# Patient Record
Sex: FEMALE | Race: BLACK OR AFRICAN AMERICAN | Employment: UNEMPLOYED | URBAN - METROPOLITAN AREA
[De-identification: names, ages, dates, MRNs, and addresses within clinical notes are randomized per-mention and may not be internally consistent; named-entity substitution may affect disease eponyms.]

---

## 2019-01-29 ENCOUNTER — APPOINTMENT (OUTPATIENT)
Dept: ULTRASOUND IMAGING | Age: 34
End: 2019-01-29
Attending: EMERGENCY MEDICINE
Payer: MEDICAID

## 2019-01-29 ENCOUNTER — HOSPITAL ENCOUNTER (EMERGENCY)
Age: 34
Discharge: HOME | End: 2019-01-29
Attending: EMERGENCY MEDICINE
Payer: MEDICAID

## 2019-01-29 VITALS
OXYGEN SATURATION: 100 % | HEART RATE: 77 BPM | RESPIRATION RATE: 16 BRPM | SYSTOLIC BLOOD PRESSURE: 124 MMHG | TEMPERATURE: 97.9 F | DIASTOLIC BLOOD PRESSURE: 85 MMHG

## 2019-01-29 DIAGNOSIS — N93.9 VAGINAL BLEEDING: Primary | ICD-10-CM

## 2019-01-29 DIAGNOSIS — D21.9 FIBROID: ICD-10-CM

## 2019-01-29 LAB
BASOPHILS # BLD: 0 K/UL (ref 0–0.1)
BASOPHILS NFR BLD: 0 % (ref 0–1)
COMMENT, HOLDF: NORMAL
DIFFERENTIAL METHOD BLD: ABNORMAL
EOSINOPHIL # BLD: 0.1 K/UL (ref 0–0.4)
EOSINOPHIL NFR BLD: 1 % (ref 0–7)
ERYTHROCYTE [DISTWIDTH] IN BLOOD BY AUTOMATED COUNT: 13.5 % (ref 11.5–14.5)
HCG UR QL: NEGATIVE
HCT VFR BLD AUTO: 33.8 % (ref 35–47)
HGB BLD-MCNC: 11.1 G/DL (ref 11.5–16)
IMM GRANULOCYTES # BLD AUTO: 0 K/UL (ref 0–0.04)
IMM GRANULOCYTES NFR BLD AUTO: 0 % (ref 0–0.5)
LYMPHOCYTES # BLD: 2.8 K/UL (ref 0.8–3.5)
LYMPHOCYTES NFR BLD: 51 % (ref 12–49)
MCH RBC QN AUTO: 29.9 PG (ref 26–34)
MCHC RBC AUTO-ENTMCNC: 32.8 G/DL (ref 30–36.5)
MCV RBC AUTO: 91.1 FL (ref 80–99)
MONOCYTES # BLD: 0.4 K/UL (ref 0–1)
MONOCYTES NFR BLD: 8 % (ref 5–13)
NEUTS SEG # BLD: 2.2 K/UL (ref 1.8–8)
NEUTS SEG NFR BLD: 40 % (ref 32–75)
NRBC # BLD: 0 K/UL (ref 0–0.01)
NRBC BLD-RTO: 0 PER 100 WBC
PLATELET # BLD AUTO: 268 K/UL (ref 150–400)
PMV BLD AUTO: 9.2 FL (ref 8.9–12.9)
RBC # BLD AUTO: 3.71 M/UL (ref 3.8–5.2)
SAMPLES BEING HELD,HOLD: NORMAL
WBC # BLD AUTO: 5.5 K/UL (ref 3.6–11)

## 2019-01-29 PROCEDURE — 99283 EMERGENCY DEPT VISIT LOW MDM: CPT

## 2019-01-29 PROCEDURE — 81025 URINE PREGNANCY TEST: CPT

## 2019-01-29 PROCEDURE — 36415 COLL VENOUS BLD VENIPUNCTURE: CPT

## 2019-01-29 PROCEDURE — 85025 COMPLETE CBC W/AUTO DIFF WBC: CPT

## 2019-01-29 PROCEDURE — 76830 TRANSVAGINAL US NON-OB: CPT

## 2019-01-29 PROCEDURE — 76856 US EXAM PELVIC COMPLETE: CPT

## 2019-01-29 RX ORDER — MEDROXYPROGESTERONE ACETATE 10 MG/1
10 TABLET ORAL 2 TIMES DAILY
Qty: 10 TAB | Refills: 0 | Status: SHIPPED | OUTPATIENT
Start: 2019-01-29

## 2019-01-29 NOTE — DISCHARGE INSTRUCTIONS
Patient Education      Billy Falling up your new prescription  Abnormal Uterine Bleeding: Care Instructions  Your Care Instructions    Abnormal uterine bleeding (AUB) is irregular bleeding from the uterus that is longer or heavier than usual or does not occur at your regular time. Sometimes it is caused by changes in hormone levels. It can also be caused by growths in the uterus, such as fibroids or polyps. Sometimes a cause cannot be found. You may have heavy bleeding when you are not expecting your period. Your doctor may suggest a pregnancy test, if you think you are pregnant. Follow-up care is a key part of your treatment and safety. Be sure to make and go to all appointments, and call your doctor if you are having problems. It's also a good idea to know your test results and keep a list of the medicines you take. How can you care for yourself at home? · Be safe with medicines. Take pain medicines exactly as directed. ? If the doctor gave you a prescription medicine for pain, take it as prescribed. ? If you are not taking a prescription pain medicine, ask your doctor if you can take an over-the-counter medicine. · You may be low in iron because of blood loss. Eat a balanced diet that is high in iron and vitamin C. Foods rich in iron include red meat, shellfish, eggs, beans, and leafy green vegetables. Talk to your doctor about whether you need to take iron pills or a multivitamin. When should you call for help? Call 911 anytime you think you may need emergency care.  For example, call if:    · You passed out (lost consciousness).    Call your doctor now or seek immediate medical care if:    · You have new or worse belly or pelvic pain.     · You have severe vaginal bleeding.     · You feel dizzy or lightheaded, or you feel like you may faint.    Watch closely for changes in your health, and be sure to contact your doctor if:    · You think you may be pregnant.     · Your bleeding gets worse.     · You do not get better as expected. Where can you learn more? Go to http://isaías-keon.info/. Enter Z446 in the search box to learn more about \"Abnormal Uterine Bleeding: Care Instructions. \"  Current as of: May 14, 2018  Content Version: 11.9  © 3871-2046 [employer]. Care instructions adapted under license by [employer] (which disclaims liability or warranty for this information). If you have questions about a medical condition or this instruction, always ask your healthcare professional. [address] any warranty or liability for your use of this information.

## 2019-01-29 NOTE — ED TRIAGE NOTES
Pt reports onset of vaginal bleeding 2 weeks ago, onset a couple of days after finishing menstrual cycle. Pt reports being dx with pelvic inflammatory disease, taking abx, reports bleeding is not improved.

## 2019-01-29 NOTE — ED NOTES
Pt given discharge instructions, patient education, prescription, and follow up information by Dr. Becka Morfin. Pt states understanding. All questions answered. Pt discharged to home in private vehicle, ambulatory. Pt A/Ox4, RA, pain controlled.

## 2019-01-29 NOTE — ED PROVIDER NOTES
HPI History of present illness:  4 para 2 abortus 2; patient had a normal period that ended about 19 days ago. Bleeding started about 15 days ago. Bleeding is about as heavy as a normal menstrual period although the patient has passed several large clots which sometimes happens with her menses. Patient is not having a lot of pelvic pain. Patient was seen in another ER. A pelvic exam was done. Apparently no medications were prescribed. The patient was seen at her primary care office [address] [place] [address] on [employer]. They apparently told the patient that she had an enlarged uterus. They asked her if she had fibroids which she has no previous history of. This started the patient on antibiotics for possible PID although the patient says they were not certain that she really had it. Patient has had some pelvic pain which has decreased since antibiotics were started. The bleeding again has continued for 15 days and is about the same throughout. Patient does not feel weak or dizzy or like she might faint. Past Medical History:  
Diagnosis Date  Drug abuse ([facility]   
 heroin daily habit  Heroin abuse ([facility] History reviewed. No pertinent surgical history. Family History:  
Problem Relation Age of Onset  Other Father   
     hepatitis b from IVDA in Father Social History Socioeconomic History  Marital status: SINGLE Spouse name: Not on file  Number of children: Not on file  Years of education: Not on file  Highest education level: Not on file Social Needs  Financial resource strain: Not on file  Food insecurity - worry: Not on file  Food insecurity - inability: Not on file  Transportation needs - medical: Not on file  Transportation needs - non-medical: Not on file Occupational History  Not on file Tobacco Use  Smoking status: Current Every Day Smoker Packs/day: 0.50  Smokeless tobacco: Never Used  Tobacco comment: vapes Substance and Sexual Activity  Alcohol use: No  
 Drug use: Yes Types: Heroin Comment: heroin insufflation daily  Sexual activity: Yes Other Topics Concern  Not on file Social History Narrative  Not on file ALLERGIES: Phenergan [promethazine] Review of Systems Constitutional: Negative for activity change, appetite change, chills and fever. HENT: Negative for congestion. Eyes: Negative for pain. Respiratory: Negative for chest tightness, shortness of breath and wheezing. Cardiovascular: Negative for chest pain. Gastrointestinal: Negative for abdominal distention and vomiting. Genitourinary: Positive for vaginal bleeding. Negative for decreased urine volume. Neurological: Negative for syncope. Psychiatric/Behavioral: Negative for agitation, behavioral problems and confusion. Vitals:  
 01/29/19 1514 01/29/19 1611 01/29/19 1612 BP:   127/87 Pulse: 80  93 Resp:   16 Temp:   97.9 °F (36.6 °C) SpO2: 100% 98% 98% Physical Exam  
Constitutional: She appears well-developed and well-nourished. HENT:  
Head: Normocephalic and atraumatic. Mouth/Throat: Oropharynx is clear and moist.  
Eyes: EOM are normal. Pupils are equal, round, and reactive to light. Neck: Normal range of motion. Neck supple. Cardiovascular: Normal rate, regular rhythm, normal heart sounds and intact distal pulses. Exam reveals no gallop and no friction rub. No murmur heard. Pulmonary/Chest: Effort normal. No respiratory distress. She has no wheezes. She has no rales. Abdominal: Soft. There is no tenderness. There is no rebound. Musculoskeletal: Normal range of motion. She exhibits no tenderness. Neurological: She is alert. No cranial nerve deficit. Motor; symmetric Skin: No erythema. Psychiatric: She has a normal mood and affect. Her behavior is normal.  
Nursing note and vitals reviewed. MDM Procedures

## 2019-02-18 ENCOUNTER — OFFICE VISIT (OUTPATIENT)
Dept: OBGYN CLINIC | Age: 34
End: 2019-02-18

## 2019-02-18 VITALS
BODY MASS INDEX: 26.52 KG/M2 | SYSTOLIC BLOOD PRESSURE: 130 MMHG | DIASTOLIC BLOOD PRESSURE: 80 MMHG | HEIGHT: 66 IN | WEIGHT: 165 LBS

## 2019-02-18 DIAGNOSIS — N93.9 ABNORMAL UTERINE BLEEDING (AUB): Primary | ICD-10-CM

## 2019-02-18 DIAGNOSIS — Z30.432 ENCOUNTER FOR IUD REMOVAL: ICD-10-CM

## 2019-02-18 LAB
HCG URINE, QL. (POC): NEGATIVE
VALID INTERNAL CONTROL?: YES

## 2019-02-18 NOTE — PATIENT INSTRUCTIONS
Abnormal Uterine Bleeding: Care Instructions  Your Care Instructions    Abnormal uterine bleeding (AUB) is irregular bleeding from the uterus that is longer or heavier than usual or does not occur at your regular time. Sometimes it is caused by changes in hormone levels. It can also be caused by growths in the uterus, such as fibroids or polyps. Sometimes a cause cannot be found. You may have heavy bleeding when you are not expecting your period. Your doctor may suggest a pregnancy test, if you think you are pregnant. Follow-up care is a key part of your treatment and safety. Be sure to make and go to all appointments, and call your doctor if you are having problems. It's also a good idea to know your test results and keep a list of the medicines you take. How can you care for yourself at home? · Be safe with medicines. Take pain medicines exactly as directed. ? If the doctor gave you a prescription medicine for pain, take it as prescribed. ? If you are not taking a prescription pain medicine, ask your doctor if you can take an over-the-counter medicine. · You may be low in iron because of blood loss. Eat a balanced diet that is high in iron and vitamin C. Foods rich in iron include red meat, shellfish, eggs, beans, and leafy green vegetables. Talk to your doctor about whether you need to take iron pills or a multivitamin. When should you call for help? Call 911 anytime you think you may need emergency care. For example, call if:    · You passed out (lost consciousness).    Call your doctor now or seek immediate medical care if:    · You have new or worse belly or pelvic pain.     · You have severe vaginal bleeding.     · You feel dizzy or lightheaded, or you feel like you may faint.    Watch closely for changes in your health, and be sure to contact your doctor if:    · You think you may be pregnant.     · Your bleeding gets worse.     · You do not get better as expected.    Where can you learn more?  Go to http://isaías-keon.info/. Enter B654 in the search box to learn more about \"Abnormal Uterine Bleeding: Care Instructions. \"  Current as of: May 14, 2018  Content Version: 11.9  © 9140-0436 [employer]. Care instructions adapted under license by [employer] (which disclaims liability or warranty for this information). If you have questions about a medical condition or this instruction, always ask your healthcare professional. [place] [address] any warranty or liability for your use of this information.

## 2019-02-18 NOTE — PROGRESS NOTES
Abnormal Uterine Bleeding      Ms. Gloria Velarde is a 29 y.o.  female presenting for evaluation of abnormal uterine bleeding. She states she has had irregular bleeding for approximately 1 month. Has copper IUD x9yr, desires removal.  Records reviewed, small simple ov cyst and small fibroid on US. Her current episode of bleeding none for the past week    Previously regular monthly cycles? yes  Menses typically last 4 days. Intermenstrual spotting/bleeding? no  Pad or tampon count: changes every 2 hours. Passage of large clots? no  Flooding? no  Associated symptoms include pelvic pain  CP, SOB, dizziness, weakness or fatigue? no  Signs of excess androgens (ie-unwanted hair growth, acne, etc)? no    Alleviating factors: none  Aggravating factors: none      History of anemia? no  Prior transfusions? no    Ob/Gyn Hx:    Patient's last menstrual period was 2018. Contraception:  STI: Denies  SA- Yes. Health maintenance:  Pap- unknown  Gardasil- n/a  Mammo- not of age  Colonoscopy- not of age    Past Medical History:   Diagnosis Date    Drug abuse ([facility]     heroin daily habit    [facility]         History reviewed. No pertinent surgical history.     Family History   Problem Relation Age of Onset    Other Father         hepatitis b from IVDA in Father       Social History     Socioeconomic History    Marital status: SINGLE     Spouse name: Not on file    Number of children: Not on file    Years of education: Not on file    Highest education level: Not on file   Social Needs    Financial resource strain: Not on file    Food insecurity - worry: Not on file    Food insecurity - inability: Not on file   [employer] needs - medical: Not on file   [employer] needs - non-medical: Not on file   Occupational History    Not on file   Tobacco Use    Smoking status: Current Every Day Smoker     Packs/day: 0.50    Smokeless tobacco: Never Used    Tobacco comment: vapes Substance and Sexual Activity    Alcohol use: No    Drug use: Yes     Types: Heroin     Comment: heroin insufflation daily    Sexual activity: Yes     Birth control/protection: IUD   Other Topics Concern    Not on file   Social History Narrative    Not on file       Prior to Admission medications    Medication Sig Start Date End Date Taking? Authorizing Provider   medroxyPROGESTERone (PROVERA) 10 mg tablet Take 1 Tab by mouth two (2) times a day. 1/29/19   Duyen Aparicio MD   cloNIDine (CATAPRES) 0.1 mg tablet Take 1 Tab by mouth every eight (8) hours as needed (feeling shaky or agitate from heroin withdrawal) for 10 doses. 7/22/13   Lorenzo Almendarez MD   ondansetron (ZOFRAN ODT) 4 mg disintegrating tablet Take 1 Tab by mouth every eight (8) hours as needed for Nausea. 7/22/13   Lorenzo Almendarez MD   ALPRAZolam Jackalyn [place]) 0.25 mg tablet Take 0.25 mg by mouth two (2) times daily as needed. Indications: ANXIETY    Provider, Historical        Allergies   Allergen Reactions    Phenergan [Promethazine] Other (comments)     Dystonia       Review of Systems - History obtained from the patient  Constitutional: negative for weight loss, fever, night sweats  HEENT: negative for hearing loss, earache, congestion, snoring, sorethroat  CV: negative for chest pain, palpitations, edema  Resp: negative for cough, shortness of breath, wheezing  Breast: negative for breast lumps, nipple discharge, galactorrhea  GI: negative for change in bowel habits, abdominal pain, black or bloody stools.  Occ constipation  : negative for frequency, dysuria, hematuria, +vaginal bleeding, as per HPI  MSK: negative for back pain, joint pain, muscle pain  Skin: negative for itching, rash, hives  Neuro: negative for dizziness, headache, confusion, weakness  Psych: negative for anxiety, depression, change in mood  Heme/lymph: negative for bleeding, bruising, pallor      Objective:    Visit Vitals  /80 (BP 1 Location: Left arm, BP Patient Position: Sitting)   Ht 5' 6\" (1.676 m)   Wt 165 lb (74.8 kg)   LMP 01/05/2018   BMI 26.63 kg/m²       PHYSICAL EXAMINATION    Constitutional  · Appearance: well-nourished, well developed, alert, in no acute distress    HENT  · Head and Face: appears normal    Neck  · Inspection/Palpation: normal appearance, no masses or tenderness  · Lymph Nodes: no lymphadenopathy present  · Thyroid: gland size normal, nontender, no nodules or masses present on palpation    Chest  · Respiratory Effort: non-labored breathing  · Auscultation: CTAB, normal breath sounds    Cardiovascular  · Heart:  · Auscultation: regular rate and rhythm without murmur  · Extremities: no peripheral edema    Gastrointestinal  · Abdominal Examination: abdomen non-tender to palpation, normal bowel sounds, no masses present  · Liver and spleen: no hepatomegaly present, spleen not palpable  · Hernias: no hernias identified    Genitourinary  · External Genitalia: normal appearance for age, no discharge present, no tenderness present, no inflammatory lesions present, no masses present, no atrophy present  · Vagina: normal vaginal vault without central or paravaginal defects, no discharge present, no inflammatory lesions present, no masses present, 0 scopettes of blood in vault  · Bladder: non-tender to palpation  · Urethra: appears normal  · Cervix: normal   · Uterus: normal size, shape and consistency, mobile  · Adnexa: no adnexal tenderness present, no adnexal masses present  · Perineum: perineum within normal limits, no evidence of trauma, no rashes or skin lesions present    Skin  · General Inspection: no rash, no lesions identified    Neurologic/Psychiatric  · Mental Status:  · Orientation: grossly oriented to person, place and time  · Mood and Affect: mood normal, affect appropriate    No results found for this or any previous visit (from the past 24 hour(s)).     UPT - neg    Assessment/Plan:   Madison Augustine is a 29 y.o. female presenting for evaluation of AUB. Discussed possible etiologies of AUB including side effect of copper IUD. Discussed work-up of AUB including exam today and TSH. CBC done in ER. She desires IUD removal today with expectant mgmt. Declines progesterone options including IUD at this time. Plan to repeat US for cyst resolution in 6-8wks    Reviewed bleeding precautions and reasons to come into ER (bleeding >1-2 pads/tampons per hour, associated dizziness, SOB, CP, fever, etc). All questions answered. RTC: prn or sooner for any problems or concerns  Instructions and handouts given to patient    Eva Segura  2019  1:43 PM     Signed By: Snehal Greer MD     2019      IUD REMOVAL    Chart reviewed for the following:  I have reviewed the History & Physical and updated Rebecca EVER Patels allergies. TIME OUT performed immediately prior to start of procedure:  I performed the following reviews on Fiordaliza Freeman prior to the start of the procedure:  Patient was identified by name and date of birth   Agreement on procedure being performed was verified  Risks and Benefits explained to the patient  Consent was signed and verified  Time: 1400  Date of procedure: 2019  Procedure performed by:  Snehal Greer MD  How tolerated by patient: Well  Post Procedural Pain Scale: 2-Hurts Minimally  Comments: None      Indications for Removal:  Fiordaliza Freeman is a ,  29 y.o. female [address] whose Patient's last menstrual period was 2018.  2018. who presents today for IUD removal.  The IUD removal procedure was discussed with the patient and she had no further questions. Procedure: The patient was placed in a dorsal lithotomy position and appropriately draped. On bimanual exam the uterus was anterior and normal in size with no tenderness present. A speculum exam was performed and the cervix was visualized. The cervix was prepped with zephiran solution. The IUD string was visualized.  Using ring forceps , the string was grasped and the IUD removed intact. The IUD was shown to the patient.      Mitch Sheehan MD 2:11 PM

## 2019-03-01 ENCOUNTER — HOSPITAL ENCOUNTER (EMERGENCY)
Age: 34
Discharge: HOME | End: 2019-03-01
Attending: EMERGENCY MEDICINE
Payer: MEDICAID

## 2019-03-01 VITALS
TEMPERATURE: 98 F | RESPIRATION RATE: 16 BRPM | HEART RATE: 80 BPM | HEIGHT: 66 IN | WEIGHT: 165 LBS | OXYGEN SATURATION: 99 % | BODY MASS INDEX: 26.52 KG/M2 | DIASTOLIC BLOOD PRESSURE: 70 MMHG | SYSTOLIC BLOOD PRESSURE: 132 MMHG

## 2019-03-01 DIAGNOSIS — N93.9 ABNORMAL UTERINE BLEEDING: Primary | ICD-10-CM

## 2019-03-01 LAB
COMMENT, HOLDF: NORMAL
SAMPLES BEING HELD,HOLD: NORMAL
T4 FREE SERPL-MCNC: 1.1 NG/DL (ref 0.8–1.5)
TSH SERPL DL<=0.05 MIU/L-ACNC: 1.01 UIU/ML (ref 0.36–3.74)

## 2019-03-01 PROCEDURE — 84439 ASSAY OF FREE THYROXINE: CPT

## 2019-03-01 PROCEDURE — 36415 COLL VENOUS BLD VENIPUNCTURE: CPT

## 2019-03-01 PROCEDURE — 99282 EMERGENCY DEPT VISIT SF MDM: CPT

## 2019-03-01 PROCEDURE — 84443 ASSAY THYROID STIM HORMONE: CPT

## 2019-03-01 NOTE — ED TRIAGE NOTES
\"I've been told that I have thyroid problems. I just got medicaid last week and will try and make an appointment with someone to see me. My temperature fluctuates and I'm here to see if someone can do something about it\".

## 2019-03-01 NOTE — ED PROVIDER NOTES
29year old female hx heroin abuse (clean for 9 months) presenting possible thyroid problem. Pt notes that about 1.5-2 months ago went to [facility] ED for vaginal bleeding, \"I was given a bullcrap diagnosis\" and followed up with GYN, treated for PID, finished medication for that. Then came to the ED here, did US showing fibroid, referred to GYN who said that they ddi not think that the issue was the fibroid because it was small. GYN said that he thought it was a thyroid issue. Pt notes that she googled it and feels that she has these symptoms - \"my body can't control it's temperature. \"  Reports feeling cold and hot at times. Also reports \"rough patches\" to skin. PMHx: as above Social: remote hx heroin use Past Medical History:  
Diagnosis Date  Drug abuse ([facility]   
 heroin daily habit  Heroin abuse ([facility] History reviewed. No pertinent surgical history. Family History:  
Problem Relation Age of Onset  Other Father   
     hepatitis b from IVDA in Father Social History Socioeconomic History  Marital status: SINGLE Spouse name: Not on file  Number of children: Not on file  Years of education: Not on file  Highest education level: Not on file Social Needs  Financial resource strain: Not on file  Food insecurity - worry: Not on file  Food insecurity - inability: Not on file  Transportation needs - medical: Not on file  Transportation needs - non-medical: Not on file Occupational History  Not on file Tobacco Use  Smoking status: Current Every Day Smoker Packs/day: 0.50  Smokeless tobacco: Never Used  Tobacco comment: vapes Substance and Sexual Activity  Alcohol use: No  
 Drug use: Yes Types: Heroin Comment: heroin insufflation daily  Sexual activity: Yes Birth control/protection: IUD Other Topics Concern  Not on file Social History Narrative  Not on file ALLERGIES: Phenergan [promethazine] Review of Systems Constitutional: Positive for fever (subjective). Respiratory: Negative for shortness of breath. Cardiovascular: Negative for chest pain. Gastrointestinal: Negative for vomiting. Skin: Negative for wound. Neurological: Negative for syncope. All other systems reviewed and are negative. Vitals:  
 03/01/19 1714 03/01/19 1829 03/01/19 2002 BP:  129/76 132/70 Pulse: 92 85 80 Resp:  17 16 Temp:  98.1 °F (36.7 °C) 98 °F (36.7 °C) SpO2: 98% 98% 99% Weight:  74.8 kg (165 lb) Height:  5' 6\" (1.676 m) Physical Exam  
Constitutional: She is oriented to person, place, and time. She appears well-developed and well-nourished. No distress. Well-appearing AA female HENT:  
Head: Normocephalic and atraumatic. Right Ear: External ear normal.  
Left Ear: External ear normal.  
Eyes: Conjunctivae are normal. No scleral icterus. Neck: Neck supple. No tracheal deviation present. Cardiovascular: Normal rate, regular rhythm and normal heart sounds. Exam reveals no gallop and no friction rub. No murmur heard. Pulmonary/Chest: Effort normal and breath sounds normal. No stridor. No respiratory distress. She has no wheezes. Abdominal: Soft. She exhibits no distension. Musculoskeletal: Normal range of motion. Neurological: She is alert and oriented to person, place, and time. Skin: Skin is warm and dry. Psychiatric: She has a normal mood and affect. Her behavior is normal.  
Nursing note and vitals reviewed. MDM Number of Diagnoses or Management Options Abnormal uterine bleeding:  
Diagnosis management comments: 29year old female presenting for multiple complaints, concerned that she may be hyperthyroid. Has seen GYN for AUB, was told that old thyroid labs were abnormal.  Thyroid labs normal today.   Discussed the need for continued GYN follow up for bleeding (joe has since stopped) and also primary care follow up. Amount and/or Complexity of Data Reviewed Clinical lab tests: ordered and reviewed Discuss the patient with other providers: yes (Dr. Clau Doe ED attending) Procedures

## 2019-03-01 NOTE — ED NOTES
Patient reports abnormal vaginal bleeding, dry skin, brittle hair and fluctuations in body temperatures.

## 2019-03-02 NOTE — DISCHARGE INSTRUCTIONS
Patient Education        Abnormal Uterine Bleeding: Care Instructions  Your Care Instructions    Abnormal uterine bleeding (AUB) is irregular bleeding from the uterus that is longer or heavier than usual or does not occur at your regular time. Sometimes it is caused by changes in hormone levels. It can also be caused by growths in the uterus, such as fibroids or polyps. Sometimes a cause cannot be found. You may have heavy bleeding when you are not expecting your period. Your doctor may suggest a pregnancy test, if you think you are pregnant. Follow-up care is a key part of your treatment and safety. Be sure to make and go to all appointments, and call your doctor if you are having problems. It's also a good idea to know your test results and keep a list of the medicines you take. How can you care for yourself at home? · Be safe with medicines. Take pain medicines exactly as directed. ? If the doctor gave you a prescription medicine for pain, take it as prescribed. ? If you are not taking a prescription pain medicine, ask your doctor if you can take an over-the-counter medicine. · You may be low in iron because of blood loss. Eat a balanced diet that is high in iron and vitamin C. Foods rich in iron include red meat, shellfish, eggs, beans, and leafy green vegetables. Talk to your doctor about whether you need to take iron pills or a multivitamin. When should you call for help? Call 911 anytime you think you may need emergency care. For example, call if:    · You passed out (lost consciousness).    Call your doctor now or seek immediate medical care if:    · You have new or worse belly or pelvic pain.     · You have severe vaginal bleeding.     · You feel dizzy or lightheaded, or you feel like you may faint.    Watch closely for changes in your health, and be sure to contact your doctor if:    · You think you may be pregnant.     · Your bleeding gets worse.     · You do not get better as expected.    Where can you learn more? Go to http://isaías-keon.info/. Enter U097 in the search box to learn more about \"Abnormal Uterine Bleeding: Care Instructions. \"  Current as of: May 14, 2018  Content Version: 11.9  © 7293-1314 [employer]. Care instructions adapted under license by [employer] (which disclaims liability or warranty for this information). If you have questions about a medical condition or this instruction, always ask your healthcare professional. [place] [address] any warranty or liability for your use of this information.

## 2021-05-15 ENCOUNTER — APPOINTMENT (OUTPATIENT)
Dept: CT IMAGING | Age: 36
DRG: 603 | End: 2021-05-15
Attending: PHYSICIAN ASSISTANT

## 2021-05-15 ENCOUNTER — HOSPITAL ENCOUNTER (INPATIENT)
Age: 36
LOS: 2 days | Discharge: HOME | DRG: 603 | End: 2021-05-17
Attending: EMERGENCY MEDICINE | Admitting: STUDENT IN AN ORGANIZED HEALTH CARE EDUCATION/TRAINING PROGRAM

## 2021-05-15 DIAGNOSIS — L03.115 CELLULITIS OF RIGHT LEG: Primary | ICD-10-CM

## 2021-05-15 DIAGNOSIS — F19.10 INTRAVENOUS DRUG ABUSE (HCC): ICD-10-CM

## 2021-05-15 LAB
ALBUMIN SERPL-MCNC: 3.5 G/DL (ref 3.5–5)
ALBUMIN/GLOB SERPL: 0.7 {RATIO} (ref 1.1–2.2)
ALP SERPL-CCNC: 128 U/L (ref 45–117)
ALT SERPL-CCNC: 33 U/L (ref 12–78)
ANION GAP SERPL CALC-SCNC: 3 MMOL/L (ref 5–15)
AST SERPL-CCNC: 48 U/L (ref 15–37)
BASOPHILS # BLD: 0.1 K/UL (ref 0–0.1)
BASOPHILS NFR BLD: 1 % (ref 0–1)
BILIRUB SERPL-MCNC: 0.4 MG/DL (ref 0.2–1)
BUN SERPL-MCNC: 16 MG/DL (ref 6–20)
BUN/CREAT SERPL: 20 (ref 12–20)
CALCIUM SERPL-MCNC: 8.3 MG/DL (ref 8.5–10.1)
CHLORIDE SERPL-SCNC: 104 MMOL/L (ref 97–108)
CO2 SERPL-SCNC: 27 MMOL/L (ref 21–32)
COMMENT, HOLDF: NORMAL
CREAT SERPL-MCNC: 0.81 MG/DL (ref 0.55–1.02)
DIFFERENTIAL METHOD BLD: ABNORMAL
EOSINOPHIL # BLD: 0.1 K/UL (ref 0–0.4)
EOSINOPHIL NFR BLD: 1 % (ref 0–7)
ERYTHROCYTE [DISTWIDTH] IN BLOOD BY AUTOMATED COUNT: 15.4 % (ref 11.5–14.5)
GLOBULIN SER CALC-MCNC: 4.9 G/DL (ref 2–4)
GLUCOSE SERPL-MCNC: 97 MG/DL (ref 65–100)
HCT VFR BLD AUTO: 28.9 % (ref 35–47)
HGB BLD-MCNC: 9.2 G/DL (ref 11.5–16)
IMM GRANULOCYTES # BLD AUTO: 0 K/UL
IMM GRANULOCYTES NFR BLD AUTO: 0 %
LACTATE SERPL-SCNC: 1.1 MMOL/L (ref 0.4–2)
LYMPHOCYTES # BLD: 3 K/UL (ref 0.8–3.5)
LYMPHOCYTES NFR BLD: 46 % (ref 12–49)
MCH RBC QN AUTO: 25.3 PG (ref 26–34)
MCHC RBC AUTO-ENTMCNC: 31.8 G/DL (ref 30–36.5)
MCV RBC AUTO: 79.6 FL (ref 80–99)
MONOCYTES # BLD: 0.5 K/UL (ref 0–1)
MONOCYTES NFR BLD: 7 % (ref 5–13)
NEUTS SEG # BLD: 2.9 K/UL (ref 1.8–8)
NEUTS SEG NFR BLD: 45 % (ref 32–75)
NRBC # BLD: 0 K/UL (ref 0–0.01)
NRBC BLD-RTO: 0 PER 100 WBC
PLATELET # BLD AUTO: 235 K/UL (ref 150–400)
PMV BLD AUTO: 10.7 FL (ref 8.9–12.9)
POTASSIUM SERPL-SCNC: 4.4 MMOL/L (ref 3.5–5.1)
PROT SERPL-MCNC: 8.4 G/DL (ref 6.4–8.2)
RBC # BLD AUTO: 3.63 M/UL (ref 3.8–5.2)
RBC MORPH BLD: ABNORMAL
SAMPLES BEING HELD,HOLD: NORMAL
SODIUM SERPL-SCNC: 134 MMOL/L (ref 136–145)
WBC # BLD AUTO: 6.6 K/UL (ref 3.6–11)
WBC MORPH BLD: ABNORMAL

## 2021-05-15 PROCEDURE — 74011250637 HC RX REV CODE- 250/637: Performed by: PHYSICIAN ASSISTANT

## 2021-05-15 PROCEDURE — 74011000258 HC RX REV CODE- 258: Performed by: PHYSICIAN ASSISTANT

## 2021-05-15 PROCEDURE — 74011250636 HC RX REV CODE- 250/636: Performed by: PHYSICIAN ASSISTANT

## 2021-05-15 PROCEDURE — 99285 EMERGENCY DEPT VISIT HI MDM: CPT

## 2021-05-15 PROCEDURE — 87147 CULTURE TYPE IMMUNOLOGIC: CPT

## 2021-05-15 PROCEDURE — 73701 CT LOWER EXTREMITY W/DYE: CPT

## 2021-05-15 PROCEDURE — 96365 THER/PROPH/DIAG IV INF INIT: CPT

## 2021-05-15 PROCEDURE — 87040 BLOOD CULTURE FOR BACTERIA: CPT

## 2021-05-15 PROCEDURE — 74011000636 HC RX REV CODE- 636: Performed by: RADIOLOGY

## 2021-05-15 PROCEDURE — 87077 CULTURE AEROBIC IDENTIFY: CPT

## 2021-05-15 PROCEDURE — 96375 TX/PRO/DX INJ NEW DRUG ADDON: CPT

## 2021-05-15 PROCEDURE — 85025 COMPLETE CBC W/AUTO DIFF WBC: CPT

## 2021-05-15 PROCEDURE — 65270000029 HC RM PRIVATE

## 2021-05-15 PROCEDURE — 83605 ASSAY OF LACTIC ACID: CPT

## 2021-05-15 PROCEDURE — 36415 COLL VENOUS BLD VENIPUNCTURE: CPT

## 2021-05-15 PROCEDURE — 87205 SMEAR GRAM STAIN: CPT

## 2021-05-15 PROCEDURE — 80053 COMPREHEN METABOLIC PANEL: CPT

## 2021-05-15 RX ORDER — KETOROLAC TROMETHAMINE 30 MG/ML
15 INJECTION, SOLUTION INTRAMUSCULAR; INTRAVENOUS
Status: COMPLETED | OUTPATIENT
Start: 2021-05-15 | End: 2021-05-15

## 2021-05-15 RX ORDER — OXYCODONE HYDROCHLORIDE 5 MG/1
5 TABLET ORAL
Status: COMPLETED | OUTPATIENT
Start: 2021-05-15 | End: 2021-05-15

## 2021-05-15 RX ORDER — VANCOMYCIN/0.9 % SOD CHLORIDE 1.5G/250ML
1500 PLASTIC BAG, INJECTION (ML) INTRAVENOUS ONCE
Status: COMPLETED | OUTPATIENT
Start: 2021-05-15 | End: 2021-05-16

## 2021-05-15 RX ADMIN — IOPAMIDOL 100 ML: 612 INJECTION, SOLUTION INTRAVENOUS at 22:13

## 2021-05-15 RX ADMIN — OXYCODONE HYDROCHLORIDE 5 MG: 5 TABLET ORAL at 20:54

## 2021-05-15 RX ADMIN — SODIUM CHLORIDE 1000 ML: 9 INJECTION, SOLUTION INTRAVENOUS at 20:54

## 2021-05-15 RX ADMIN — KETOROLAC TROMETHAMINE 15 MG: 30 INJECTION, SOLUTION INTRAMUSCULAR; INTRAVENOUS at 20:54

## 2021-05-15 RX ADMIN — PIPERACILLIN AND TAZOBACTAM 3.38 G: 3; .375 INJECTION, POWDER, FOR SOLUTION INTRAVENOUS at 22:26

## 2021-05-15 RX ADMIN — VANCOMYCIN HYDROCHLORIDE 1500 MG: 10 INJECTION, POWDER, LYOPHILIZED, FOR SOLUTION INTRAVENOUS at 23:12

## 2021-05-15 NOTE — ED TRIAGE NOTES
Pt reports abscess to R calf area x1 day. Pt reports it worsened yesterday and she attempted to salazar it and now there is redness surrounding it.

## 2021-05-15 NOTE — ED PROVIDER NOTES
39year old female presenting to the ED for an abscess. Notes that it started as a small red bump, the size of a marble - thought it would go away. First noticed it maybe about a week ago. Yesterday \"it just blew up, it came to a head. \"  Progressively getting more swollen, painful. Tried to drain it with a needle. No fever or vomiting. Patient notes that the area is severely painful, throbbing, worse with palpation. Has been draining and is malodorous. PMHx: heroin, IV use daily - x 7 or 8 years. No alcohol use.  + smoker  Social: unemployed  Allergies: phenergan - DENIES    The history is provided by the patient. Abscess          Past Medical History:   Diagnosis Date    Drug abuse [facility])     heroin daily habit    Heroin abuse ([facility]        History reviewed. No pertinent surgical history. Family History:   Problem Relation Age of Onset    Other Father         hepatitis b from IVDA in Father       Social History     Socioeconomic History    Marital status: SINGLE     Spouse name: Not on file    Number of children: Not on file    Years of education: Not on file    Highest education level: Not on file   Occupational History    Not on file   Social Needs    Financial resource strain: Not on file    Food insecurity     Worry: Not on file     Inability: Not on file    Transportation needs     Medical: Not on file     Non-medical: Not on file   Tobacco Use    Smoking status: Current Every Day Smoker     Packs/day: 0.50    Smokeless tobacco: Never Used    Tobacco comment: vapes   Substance and Sexual Activity    Alcohol use: No    Drug use: Yes     Types: Heroin     Comment: heroin insufflation daily    Sexual activity: Yes     Birth control/protection: I.U.D.    Lifestyle    Physical activity     Days per week: Not on file     Minutes per session: Not on file    Stress: Not on file   Relationships    Social connections     Talks on phone: Not on file     Gets together: Not on file Attends Congregational service: Not on file     Active member of club or organization: Not on file     Attends meetings of clubs or organizations: Not on file     Relationship status: Not on file    Intimate partner violence     Fear of current or ex partner: Not on file     Emotionally abused: Not on file     Physically abused: Not on file     Forced sexual activity: Not on file   Other Topics Concern    Not on file   Social History Narrative    Not on file         ALLERGIES: Phenergan [promethazine]    Review of Systems   Constitutional: Negative for fever. HENT: Negative for facial swelling. Respiratory: Negative for shortness of breath. Cardiovascular: Positive for leg swelling. Negative for chest pain. Gastrointestinal: Negative for vomiting. Skin: Positive for color change and wound. Neurological: Negative for syncope. All other systems reviewed and are negative. Vitals:    05/15/21 2100 05/15/21 2130 05/15/21 2329 05/15/21 2330   BP: 102/69 115/69 114/75    Pulse: 94 100 94 94   Resp: 20 17 16 19   Temp:       SpO2: 97%   98%   Weight:       Height:                Physical Exam  Vitals signs and nursing note reviewed. Constitutional:       General: She is not in acute distress. Appearance: She is well-developed. Comments: Pleasant black female, appears uncomfortable, limps when she walks   HENT:      Head: Normocephalic and atraumatic. Right Ear: External ear normal.      Left Ear: External ear normal.   Eyes:      General: No scleral icterus. Conjunctiva/sclera: Conjunctivae normal.   Neck:      Musculoskeletal: Neck supple. Trachea: No tracheal deviation. Cardiovascular:      Rate and Rhythm: Normal rate and regular rhythm. Heart sounds: Normal heart sounds. No murmur. No friction rub. No gallop. Pulmonary:      Effort: Pulmonary effort is normal. No respiratory distress. Breath sounds: Normal breath sounds. No stridor. No wheezing.    Abdominal: General: There is no distension. Palpations: Abdomen is soft. Musculoskeletal: Normal range of motion. Comments: See photos   Skin:     General: Skin is warm and dry. Neurological:      Mental Status: She is alert and oriented to person, place, and time. Psychiatric:         Behavior: Behavior normal.          MDM  Number of Diagnoses or Management Options  Diagnosis management comments: 14-year-old female history of daily IV heroin use presenting to the ED for leg pain, swelling, redness, drainage at the site of IV drug use. Tachycardic on arrival, however not febrile or hypotensive. Will check labs, imaging to evaluate for deep abscess given needle use in that area. Overall reassuring labs, CT scan showing no evidence of fluid collection. Given rapid progression of symptoms, degree of pain, risk factors for polymicrobial infection, poor compliance, medicine consulted for admission.        Amount and/or Complexity of Data Reviewed  Clinical lab tests: ordered and reviewed  Tests in the radiology section of CPT®: ordered and reviewed  Discuss the patient with other providers: yes (Dr. Alo Keating ED attending)           Procedures                             Perfect Serve Consult for Admission  11:20 PM    ED Room Number: LK76/65  Patient Name and age:  Jose Keith 39 y.o.  female  Working Diagnosis: R leg cellulitis  COVID-19 Suspicion:  no  Sepsis present:  no  Reassessment needed: no  Code Status:  Full Code  Readmission: no  Isolation Requirements:  yes  Recommended Level of Care:  med/surg  Department:[facility] Adult ED - [phone]  Other:  39year old - hx IV heroin use - had been shooting up in her leg - developed an infection - it is pretty impressive, photos in the chart - not septic

## 2021-05-16 LAB
ALBUMIN SERPL-MCNC: 2.6 G/DL (ref 3.5–5)
ALBUMIN/GLOB SERPL: 0.7 {RATIO} (ref 1.1–2.2)
ALP SERPL-CCNC: 101 U/L (ref 45–117)
ALT SERPL-CCNC: 24 U/L (ref 12–78)
AMPHET UR QL SCN: NEGATIVE
ANION GAP SERPL CALC-SCNC: 8 MMOL/L (ref 5–15)
AST SERPL-CCNC: 20 U/L (ref 15–37)
BARBITURATES UR QL SCN: NEGATIVE
BASOPHILS # BLD: 0 K/UL (ref 0–0.1)
BASOPHILS NFR BLD: 0 % (ref 0–1)
BENZODIAZ UR QL: NEGATIVE
BILIRUB SERPL-MCNC: 0.3 MG/DL (ref 0.2–1)
BUN SERPL-MCNC: 10 MG/DL (ref 6–20)
BUN/CREAT SERPL: 16 (ref 12–20)
CALCIUM SERPL-MCNC: 7.2 MG/DL (ref 8.5–10.1)
CANNABINOIDS UR QL SCN: NEGATIVE
CHLORIDE SERPL-SCNC: 108 MMOL/L (ref 97–108)
CO2 SERPL-SCNC: 24 MMOL/L (ref 21–32)
COCAINE UR QL SCN: POSITIVE
CREAT SERPL-MCNC: 0.62 MG/DL (ref 0.55–1.02)
DIFFERENTIAL METHOD BLD: ABNORMAL
DRUG SCRN COMMENT,DRGCM: ABNORMAL
EOSINOPHIL # BLD: 0.1 K/UL (ref 0–0.4)
EOSINOPHIL NFR BLD: 1 % (ref 0–7)
ERYTHROCYTE [DISTWIDTH] IN BLOOD BY AUTOMATED COUNT: 15.8 % (ref 11.5–14.5)
ETHANOL SERPL-MCNC: <10 MG/DL
GLOBULIN SER CALC-MCNC: 3.8 G/DL (ref 2–4)
GLUCOSE SERPL-MCNC: 94 MG/DL (ref 65–100)
HCT VFR BLD AUTO: 31.3 % (ref 35–47)
HGB BLD-MCNC: 9.5 G/DL (ref 11.5–16)
IMM GRANULOCYTES # BLD AUTO: 0 K/UL (ref 0–0.04)
IMM GRANULOCYTES NFR BLD AUTO: 0 % (ref 0–0.5)
LYMPHOCYTES # BLD: 2 K/UL (ref 0.8–3.5)
LYMPHOCYTES NFR BLD: 36 % (ref 12–49)
MCH RBC QN AUTO: 25 PG (ref 26–34)
MCHC RBC AUTO-ENTMCNC: 30.4 G/DL (ref 30–36.5)
MCV RBC AUTO: 82.4 FL (ref 80–99)
METHADONE UR QL: NEGATIVE
MONOCYTES # BLD: 0.6 K/UL (ref 0–1)
MONOCYTES NFR BLD: 12 % (ref 5–13)
NEUTS SEG # BLD: 2.8 K/UL (ref 1.8–8)
NEUTS SEG NFR BLD: 51 % (ref 32–75)
NRBC # BLD: 0 K/UL (ref 0–0.01)
NRBC BLD-RTO: 0 PER 100 WBC
OPIATES UR QL: POSITIVE
PCP UR QL: NEGATIVE
PLATELET # BLD AUTO: 247 K/UL (ref 150–400)
PMV BLD AUTO: 10 FL (ref 8.9–12.9)
POTASSIUM SERPL-SCNC: 3.4 MMOL/L (ref 3.5–5.1)
PROT SERPL-MCNC: 6.4 G/DL (ref 6.4–8.2)
RBC # BLD AUTO: 3.8 M/UL (ref 3.8–5.2)
SODIUM SERPL-SCNC: 140 MMOL/L (ref 136–145)
WBC # BLD AUTO: 5.5 K/UL (ref 3.6–11)

## 2021-05-16 PROCEDURE — 36415 COLL VENOUS BLD VENIPUNCTURE: CPT

## 2021-05-16 PROCEDURE — 74011250636 HC RX REV CODE- 250/636: Performed by: STUDENT IN AN ORGANIZED HEALTH CARE EDUCATION/TRAINING PROGRAM

## 2021-05-16 PROCEDURE — 80053 COMPREHEN METABOLIC PANEL: CPT

## 2021-05-16 PROCEDURE — 80307 DRUG TEST PRSMV CHEM ANLYZR: CPT

## 2021-05-16 PROCEDURE — 65270000029 HC RM PRIVATE

## 2021-05-16 PROCEDURE — 74011250637 HC RX REV CODE- 250/637: Performed by: STUDENT IN AN ORGANIZED HEALTH CARE EDUCATION/TRAINING PROGRAM

## 2021-05-16 PROCEDURE — 85025 COMPLETE CBC W/AUTO DIFF WBC: CPT

## 2021-05-16 PROCEDURE — 74011000258 HC RX REV CODE- 258: Performed by: STUDENT IN AN ORGANIZED HEALTH CARE EDUCATION/TRAINING PROGRAM

## 2021-05-16 PROCEDURE — 99251 PR INITL INPATIENT CONSULT NEW/ESTAB PT 20 MIN: CPT | Performed by: SURGERY

## 2021-05-16 PROCEDURE — 82077 ASSAY SPEC XCP UR&BREATH IA: CPT

## 2021-05-16 RX ORDER — SODIUM CHLORIDE 0.9 % (FLUSH) 0.9 %
5-40 SYRINGE (ML) INJECTION AS NEEDED
Status: DISCONTINUED | OUTPATIENT
Start: 2021-05-16 | End: 2021-05-17 | Stop reason: HOSPADM

## 2021-05-16 RX ORDER — POLYETHYLENE GLYCOL 3350 17 G/17G
17 POWDER, FOR SOLUTION ORAL DAILY PRN
Status: DISCONTINUED | OUTPATIENT
Start: 2021-05-16 | End: 2021-05-17 | Stop reason: HOSPADM

## 2021-05-16 RX ORDER — SODIUM CHLORIDE 9 MG/ML
125 INJECTION, SOLUTION INTRAVENOUS CONTINUOUS
Status: DISCONTINUED | OUTPATIENT
Start: 2021-05-16 | End: 2021-05-17 | Stop reason: HOSPADM

## 2021-05-16 RX ORDER — PROMETHAZINE HYDROCHLORIDE 25 MG/1
12.5 TABLET ORAL
Status: DISCONTINUED | OUTPATIENT
Start: 2021-05-16 | End: 2021-05-17 | Stop reason: HOSPADM

## 2021-05-16 RX ORDER — DIAZEPAM 5 MG/1
5 TABLET ORAL
Status: DISCONTINUED | OUTPATIENT
Start: 2021-05-16 | End: 2021-05-17 | Stop reason: HOSPADM

## 2021-05-16 RX ORDER — ONDANSETRON 2 MG/ML
4 INJECTION INTRAMUSCULAR; INTRAVENOUS
Status: DISCONTINUED | OUTPATIENT
Start: 2021-05-16 | End: 2021-05-17 | Stop reason: HOSPADM

## 2021-05-16 RX ORDER — ACETAMINOPHEN 650 MG/1
650 SUPPOSITORY RECTAL
Status: DISCONTINUED | OUTPATIENT
Start: 2021-05-16 | End: 2021-05-17 | Stop reason: HOSPADM

## 2021-05-16 RX ORDER — ACETAMINOPHEN 325 MG/1
650 TABLET ORAL
Status: DISCONTINUED | OUTPATIENT
Start: 2021-05-16 | End: 2021-05-17 | Stop reason: HOSPADM

## 2021-05-16 RX ORDER — SODIUM CHLORIDE 0.9 % (FLUSH) 0.9 %
5-40 SYRINGE (ML) INJECTION EVERY 8 HOURS
Status: DISCONTINUED | OUTPATIENT
Start: 2021-05-16 | End: 2021-05-17 | Stop reason: HOSPADM

## 2021-05-16 RX ORDER — VANCOMYCIN HYDROCHLORIDE
1250 EVERY 12 HOURS
Status: DISCONTINUED | OUTPATIENT
Start: 2021-05-16 | End: 2021-05-17 | Stop reason: HOSPADM

## 2021-05-16 RX ORDER — ENOXAPARIN SODIUM 100 MG/ML
40 INJECTION SUBCUTANEOUS DAILY
Status: DISCONTINUED | OUTPATIENT
Start: 2021-05-16 | End: 2021-05-17 | Stop reason: HOSPADM

## 2021-05-16 RX ADMIN — Medication 10 ML: at 06:00

## 2021-05-16 RX ADMIN — VANCOMYCIN HYDROCHLORIDE 1250 MG: 10 INJECTION, POWDER, LYOPHILIZED, FOR SOLUTION INTRAVENOUS at 23:30

## 2021-05-16 RX ADMIN — PIPERACILLIN AND TAZOBACTAM 3.38 G: 3; .375 INJECTION, POWDER, LYOPHILIZED, FOR SOLUTION INTRAVENOUS at 21:56

## 2021-05-16 RX ADMIN — Medication 10 ML: at 13:37

## 2021-05-16 RX ADMIN — DIAZEPAM 5 MG: 5 TABLET ORAL at 17:40

## 2021-05-16 RX ADMIN — ENOXAPARIN SODIUM 40 MG: 40 INJECTION SUBCUTANEOUS at 09:46

## 2021-05-16 RX ADMIN — PIPERACILLIN AND TAZOBACTAM 3.38 G: 3; .375 INJECTION, POWDER, LYOPHILIZED, FOR SOLUTION INTRAVENOUS at 13:37

## 2021-05-16 RX ADMIN — VANCOMYCIN HYDROCHLORIDE 1250 MG: 10 INJECTION, POWDER, LYOPHILIZED, FOR SOLUTION INTRAVENOUS at 11:04

## 2021-05-16 RX ADMIN — SODIUM CHLORIDE 125 ML/HR: 9 INJECTION, SOLUTION INTRAVENOUS at 02:25

## 2021-05-16 RX ADMIN — PIPERACILLIN AND TAZOBACTAM 3.38 G: 3; .375 INJECTION, POWDER, LYOPHILIZED, FOR SOLUTION INTRAVENOUS at 05:00

## 2021-05-16 RX ADMIN — SODIUM CHLORIDE 125 ML/HR: 9 INJECTION, SOLUTION INTRAVENOUS at 09:47

## 2021-05-16 RX ADMIN — SODIUM CHLORIDE 125 ML/HR: 9 INJECTION, SOLUTION INTRAVENOUS at 21:56

## 2021-05-16 NOTE — CONSULTS
Chief Complaint:  Right leg abscess    HPI:    40 yo woman with hx IV drug use consulted for right leg abscess. Pt reported noticing a small pimply irritation to her right leg about 2 weeks ago. It great bigger with erythema and edema to her leg and calf. Pt reported it came it a head and rupture. She reported swelling and redness has improved since admission. RLE CT scan showed soft tissue edema but no abscess collection    Past Medical History:   Diagnosis Date    Drug abuse ([facility]     heroin daily habit    Heroin abuse ([facility]      History reviewed. No pertinent surgical history. No current facility-administered medications on file prior to encounter. Current Outpatient Medications on File Prior to Encounter   Medication Sig Dispense Refill    medroxyPROGESTERone (PROVERA) 10 mg tablet Take 1 Tab by mouth two (2) times a day. 10 Tab 0    cloNIDine (CATAPRES) 0.1 mg tablet Take 1 Tab by mouth every eight (8) hours as needed (feeling shaky or agitate from heroin withdrawal) for 10 doses. 8 Tab 0    ondansetron (ZOFRAN ODT) 4 mg disintegrating tablet Take 1 Tab by mouth every eight (8) hours as needed for Nausea. 20 Tab 0    ALPRAZolam (XANAX) 0.25 mg tablet Take 0.25 mg by mouth two (2) times daily as needed. Indications: ANXIETY         Allergies   Allergen Reactions    Phenergan [Promethazine] Other (comments)     Dystonia       Review of Systems - General ROS: negative  Psychological ROS: negative  Respiratory ROS: negative  Cardiovascular ROS: negative  Gastrointestinal ROS: negative  Skin:  RLE cellulitis    Visit Vitals  /72 (BP 1 Location: Right upper arm, BP Patient Position: At rest)   Pulse 74   Temp 98.5 °F (36.9 °C)   Resp 16   Ht 5' 5\" (1.651 m)   Wt 160 lb (72.6 kg)   SpO2 96%   BMI 26.63 kg/m²       Physical Exam:    Gen:  NAD  RLE:  Mild erythema with moderate induration to right lateral upper leg just below the knee with eschar and minimal drainage.   Motor and sensation intact. Moderate TTP without fluctuance or purulent drainage    Recent Results (from the past 24 hour(s))   CULTURE, BLOOD    Collection Time: 05/15/21  8:22 PM    Specimen: Blood   Result Value Ref Range    Special Requests: NO SPECIAL REQUESTS      Culture result: NO GROWTH AFTER 8 HOURS     CULTURE, BLOOD    Collection Time: 05/15/21  8:22 PM    Specimen: Blood   Result Value Ref Range    Special Requests: NO SPECIAL REQUESTS      Culture result: NO GROWTH AFTER 8 HOURS     METABOLIC PANEL, COMPREHENSIVE    Collection Time: 05/15/21  8:22 PM   Result Value Ref Range    Sodium 134 (L) 136 - 145 mmol/L    Potassium 4.4 3.5 - 5.1 mmol/L    Chloride 104 97 - 108 mmol/L    CO2 27 21 - 32 mmol/L    Anion gap 3 (L) 5 - 15 mmol/L    Glucose 97 65 - 100 mg/dL    BUN 16 6 - 20 MG/DL    Creatinine 0.81 0.55 - 1.02 MG/DL    BUN/Creatinine ratio 20 12 - 20      GFR est AA >60 >60 ml/min/1.73m2    GFR est non-AA >60 >60 ml/min/1.73m2    Calcium 8.3 (L) 8.5 - 10.1 MG/DL    Bilirubin, total 0.4 0.2 - 1.0 MG/DL    ALT (SGPT) 33 12 - 78 U/L    AST (SGOT) 48 (H) 15 - 37 U/L    Alk. phosphatase 128 (H) 45 - 117 U/L    Protein, total 8.4 (H) 6.4 - 8.2 g/dL    Albumin 3.5 3.5 - 5.0 g/dL    Globulin 4.9 (H) 2.0 - 4.0 g/dL    A-G Ratio 0.7 (L) 1.1 - 2.2     LACTIC ACID    Collection Time: 05/15/21  8:22 PM   Result Value Ref Range    Lactic acid 1.1 0.4 - 2.0 MMOL/L   SAMPLES BEING HELD    Collection Time: 05/15/21  8:22 PM   Result Value Ref Range    SAMPLES BEING HELD 1 RED, 1 DORCAS     COMMENT        Add-on orders for these samples will be processed based on acceptable specimen integrity and analyte stability, which may vary by analyte. CULTURE, WOUND Thereasa Gravely STAIN    Collection Time: 05/15/21  9:26 PM    Specimen: Abscess;  Wound   Result Value Ref Range    Special Requests: NO SPECIAL REQUESTS      GRAM STAIN RARE WBCS SEEN      GRAM STAIN MODERATE GRAM NEGATIVE RODS      GRAM STAIN FEW GRAM POSITIVE RODS      GRAM STAIN FEW Kandee Abelardo POSITIVE COCCI IN PAIRS      Culture result: CULTURE IN PROGRESS,FURTHER UPDATES TO FOLLOW     CBC WITH AUTOMATED DIFF    Collection Time: 05/15/21  9:26 PM   Result Value Ref Range    WBC 6.6 3.6 - 11.0 K/uL    RBC 3.63 (L) 3.80 - 5.20 M/uL    HGB 9.2 (L) 11.5 - 16.0 g/dL    HCT 28.9 (L) 35.0 - 47.0 %    MCV 79.6 (L) 80.0 - 99.0 FL    MCH 25.3 (L) 26.0 - 34.0 PG    MCHC 31.8 30.0 - 36.5 g/dL    RDW 15.4 (H) 11.5 - 14.5 %    PLATELET 309 010 - 865 K/uL    MPV 10.7 8.9 - 12.9 FL    NRBC 0.0 0  WBC    ABSOLUTE NRBC 0.00 0.00 - 0.01 K/uL    NEUTROPHILS 45 32 - 75 %    LYMPHOCYTES 46 12 - 49 %    MONOCYTES 7 5 - 13 %    EOSINOPHILS 1 0 - 7 %    BASOPHILS 1 0 - 1 %    IMMATURE GRANULOCYTES 0 %    ABS. NEUTROPHILS 2.9 1.8 - 8.0 K/UL    ABS. LYMPHOCYTES 3.0 0.8 - 3.5 K/UL    ABS. MONOCYTES 0.5 0.0 - 1.0 K/UL    ABS. EOSINOPHILS 0.1 0.0 - 0.4 K/UL    ABS. BASOPHILS 0.1 0.0 - 0.1 K/UL    ABS. IMM. GRANS. 0.0 K/UL    DF MANUAL      RBC COMMENTS ANISOCYTOSIS  1+        RBC COMMENTS MICROCYTOSIS  1+        RBC COMMENTS HYPOCHROMIA  1+        WBC COMMENTS ATYPICAL LYMPHOCYTES PRESENT     METABOLIC PANEL, COMPREHENSIVE    Collection Time: 05/16/21  8:11 AM   Result Value Ref Range    Sodium 140 136 - 145 mmol/L    Potassium 3.4 (L) 3.5 - 5.1 mmol/L    Chloride 108 97 - 108 mmol/L    CO2 24 21 - 32 mmol/L    Anion gap 8 5 - 15 mmol/L    Glucose 94 65 - 100 mg/dL    BUN 10 6 - 20 MG/DL    Creatinine 0.62 0.55 - 1.02 MG/DL    BUN/Creatinine ratio 16 12 - 20      GFR est AA >60 >60 ml/min/1.73m2    GFR est non-AA >60 >60 ml/min/1.73m2    Calcium 7.2 (L) 8.5 - 10.1 MG/DL    Bilirubin, total 0.3 0.2 - 1.0 MG/DL    ALT (SGPT) 24 12 - 78 U/L    AST (SGOT) 20 15 - 37 U/L    Alk.  phosphatase 101 45 - 117 U/L    Protein, total 6.4 6.4 - 8.2 g/dL    Albumin 2.6 (L) 3.5 - 5.0 g/dL    Globulin 3.8 2.0 - 4.0 g/dL    A-G Ratio 0.7 (L) 1.1 - 2.2     CBC WITH AUTOMATED DIFF    Collection Time: 05/16/21  8:11 AM   Result Value Ref Range WBC 5.5 3.6 - 11.0 K/uL    RBC 3.80 3.80 - 5.20 M/uL    HGB 9.5 (L) 11.5 - 16.0 g/dL    HCT 31.3 (L) 35.0 - 47.0 %    MCV 82.4 80.0 - 99.0 FL    MCH 25.0 (L) 26.0 - 34.0 PG    MCHC 30.4 30.0 - 36.5 g/dL    RDW 15.8 (H) 11.5 - 14.5 %    PLATELET 689 557 - 934 K/uL    MPV 10.0 8.9 - 12.9 FL    NRBC 0.0 0  WBC    ABSOLUTE NRBC 0.00 0.00 - 0.01 K/uL    NEUTROPHILS 51 32 - 75 %    LYMPHOCYTES 36 12 - 49 %    MONOCYTES 12 5 - 13 %    EOSINOPHILS 1 0 - 7 %    BASOPHILS 0 0 - 1 %    IMMATURE GRANULOCYTES 0 0.0 - 0.5 %    ABS. NEUTROPHILS 2.8 1.8 - 8.0 K/UL    ABS. LYMPHOCYTES 2.0 0.8 - 3.5 K/UL    ABS. MONOCYTES 0.6 0.0 - 1.0 K/UL    ABS. EOSINOPHILS 0.1 0.0 - 0.4 K/UL    ABS. BASOPHILS 0.0 0.0 - 0.1 K/UL    ABS. IMM. GRANS. 0.0 0.00 - 0.04 K/UL    DF AUTOMATED     ETHYL ALCOHOL    Collection Time: 05/16/21  8:11 AM   Result Value Ref Range    ALCOHOL(ETHYL),SERUM <10 <10 MG/DL   DRUG SCREEN, URINE    Collection Time: 05/16/21  8:11 AM   Result Value Ref Range    AMPHETAMINES Negative NEG      BARBITURATES Negative NEG      BENZODIAZEPINES Negative NEG      COCAINE Positive (A) NEG      METHADONE Negative NEG      OPIATES Positive (A) NEG      PCP(PHENCYCLIDINE) Negative NEG      THC (TH-CANNABINOL) Negative NEG      Drug screen comment (NOTE)        AP:  40 yo woman consulted for RLE cellulitis    - RLE cellulitis:  No acute indication for I&D.   Abscess is already draining and now with eschar formation.  - Continue IV antibiotic therapy  - Local wound care with dry gauze dressing changes

## 2021-05-16 NOTE — PROGRESS NOTES
Pharmacist Note - Vancomycin Dosing    Consult provided for this 39 y.o. female for indication of a SSTI. Antibiotic regimen(s): Vancomycin and Zosyn  Patient on vancomycin PTA? NO     Recent Labs     05/15/21  2126 05/15/21  2022   WBC 6.6  --    CREA  --  0.81   BUN  --  16     Frequency of BMP: daily through   Height: 165.1 cm  Weight: 72.6 kg  Est CrCl: ~96 ml/min  Temp (24hrs), Av.5 °F (36.9 °C), Min:98.5 °F (36.9 °C), Max:98.6 °F (37 °C)    Cultures:  5/15 Blood:  pending  5/15 Wound:  pending    Goal trough = 10 - 15 mcg/mL    Therapy will be initiated with a loading dose of 1500 mg IV x 1 to be followed by a maintenance dose of 1250 mg IV every 12 hours. Pharmacy to follow patient daily and order levels / make dose adjustments as appropriate.

## 2021-05-16 NOTE — H&P
History & Physical    Primary Care Provider: Kev Coleman MD  Source of Information: Patient and chart review    History of Presenting Illness:   Dinah Trimble is a 39 y.o. female with past medical history of generalized anxiety disorder, IV heroin use daily who presents to hospital due to concerns of infection in her right foot. Patient states that she had tried to inject her in her right lower extremity about 2 weeks ago but had missed a vein. She noticed a small area of induration and erythema which she states has worsened over the last week. She now describes increased erythema and swelling around the area as well as tenderness. She has had chills but no fevers or night sweats. No recent travel or sick contacts. States she has previously been on Suboxone therapy but relapsed. Review of Systems:  A comprehensive review of systems was negative except for that written in the History of Present Illness. Past Medical History:   Diagnosis Date    Drug abuse [facility])     heroin daily habit    Heroin abuse ([facility]       History reviewed. No pertinent surgical history. Prior to Admission medications    Medication Sig Start Date End Date Taking? Authorizing Provider   medroxyPROGESTERone (PROVERA) 10 mg tablet Take 1 Tab by mouth two (2) times a day. 1/29/19   Elie Zee MD   cloNIDine (CATAPRES) 0.1 mg tablet Take 1 Tab by mouth every eight (8) hours as needed (feeling shaky or agitate from heroin withdrawal) for 10 doses. 7/22/13   Soledad Xie MD   ondansetron (ZOFRAN ODT) 4 mg disintegrating tablet Take 1 Tab by mouth every eight (8) hours as needed for Nausea. 7/22/13   Soledad Xie MD   [facility] 0.25 mg tablet Take 0.25 mg by mouth two (2) times daily as needed.  Indications: ANXIETY    Provider, Historical     Allergies   Allergen Reactions    Phenergan [Promethazine] Other (comments)     Dystonia      Family History   Problem Relation Age of Onset    Other Father         hepatitis b from IVDA in Father        SOCIAL HISTORY:  Patient resides:  Independently x   Assisted Living    SNF    With family care       Smoking history:   None x   Former    Chronic      Alcohol history:   None x   Social    Chronic      Ambulates:   Independently x   w/cane    w/walker    w/wc    CODE STATUS:  DNR    Full x   Other      Objective:     Physical Exam:     Visit Vitals  /69   Pulse 94   Temp 98.5 °F (36.9 °C)   Resp 20   Ht 5' 5\" (1.651 m)   Wt 72.6 kg (160 lb)   SpO2 97%   BMI 26.63 kg/m²      O2 Device: None (Room air)    General:  Alert, cooperative, no distress, appears stated age. Head:  Normocephalic, without obvious abnormality, atraumatic. Eyes:  Conjunctivae/corneas clear. PERRL, EOMs intact. Nose: Nares normal. Septum midline. Mucosa normal.        Neck: Supple, symmetrical, trachea midline, no carotid bruit and no JVD. Lungs:   Clear to auscultation bilaterally. Chest wall:  No tenderness or deformity. Heart:  Regular rate and rhythm, S1, S2 normal, no murmur, click, rub or gallop. Abdomen:   Soft, non-tender. Bowel sounds normal. No masses,  No organomegaly. Extremities: Extremities normal, atraumatic, no cyanosis or edema. Pulses: 2+ and symmetric all extremities. Skin: Skin color, texture, turgor normal. No rashes or lesions         Neurologic: CNII-XII intact. Data Review:     Recent Days:  Recent Labs     05/15/21  2126   WBC 6.6   HGB 9.2*   HCT 28.9*        Recent Labs     05/15/21  2022   *   K 4.4      CO2 27   GLU 97   BUN 16   CREA 0.81   CA 8.3*   ALB 3.5   ALT 33     No results for input(s): PH, PCO2, PO2, HCO3, FIO2 in the last 72 hours.     24 Hour Results:  Recent Results (from the past 24 hour(s))   METABOLIC PANEL, COMPREHENSIVE    Collection Time: 05/15/21  8:22 PM   Result Value Ref Range    Sodium 134 (L) 136 - 145 mmol/L    Potassium 4.4 3.5 - 5.1 mmol/L    Chloride 104 97 - 108 mmol/L    CO2 27 21 - 32 mmol/L    Anion gap 3 (L) 5 - 15 mmol/L    Glucose 97 65 - 100 mg/dL    BUN 16 6 - 20 MG/DL    Creatinine 0.81 0.55 - 1.02 MG/DL    BUN/Creatinine ratio 20 12 - 20      GFR est AA >60 >60 ml/min/1.73m2    GFR est non-AA >60 >60 ml/min/1.73m2    Calcium 8.3 (L) 8.5 - 10.1 MG/DL    Bilirubin, total 0.4 0.2 - 1.0 MG/DL    ALT (SGPT) 33 12 - 78 U/L    AST (SGOT) 48 (H) 15 - 37 U/L    Alk. phosphatase 128 (H) 45 - 117 U/L    Protein, total 8.4 (H) 6.4 - 8.2 g/dL    Albumin 3.5 3.5 - 5.0 g/dL    Globulin 4.9 (H) 2.0 - 4.0 g/dL    A-G Ratio 0.7 (L) 1.1 - 2.2     LACTIC ACID    Collection Time: 05/15/21  8:22 PM   Result Value Ref Range    Lactic acid 1.1 0.4 - 2.0 MMOL/L   SAMPLES BEING HELD    Collection Time: 05/15/21  8:22 PM   Result Value Ref Range    SAMPLES BEING HELD 1 RED, 1 DORCAS     COMMENT        Add-on orders for these samples will be processed based on acceptable specimen integrity and analyte stability, which may vary by analyte. CBC WITH AUTOMATED DIFF    Collection Time: 05/15/21  9:26 PM   Result Value Ref Range    WBC 6.6 3.6 - 11.0 K/uL    RBC 3.63 (L) 3.80 - 5.20 M/uL    HGB 9.2 (L) 11.5 - 16.0 g/dL    HCT 28.9 (L) 35.0 - 47.0 %    MCV 79.6 (L) 80.0 - 99.0 FL    MCH 25.3 (L) 26.0 - 34.0 PG    MCHC 31.8 30.0 - 36.5 g/dL    RDW 15.4 (H) 11.5 - 14.5 %    PLATELET 510 432 - 217 K/uL    MPV 10.7 8.9 - 12.9 FL    NRBC 0.0 0  WBC    ABSOLUTE NRBC 0.00 0.00 - 0.01 K/uL    NEUTROPHILS 45 32 - 75 %    LYMPHOCYTES 46 12 - 49 %    MONOCYTES 7 5 - 13 %    EOSINOPHILS 1 0 - 7 %    BASOPHILS 1 0 - 1 %    IMMATURE GRANULOCYTES 0 %    ABS. NEUTROPHILS 2.9 1.8 - 8.0 K/UL    ABS. LYMPHOCYTES 3.0 0.8 - 3.5 K/UL    ABS. MONOCYTES 0.5 0.0 - 1.0 K/UL    ABS. EOSINOPHILS 0.1 0.0 - 0.4 K/UL    ABS. BASOPHILS 0.1 0.0 - 0.1 K/UL    ABS. IMM.  GRANS. 0.0 K/UL    DF MANUAL      RBC COMMENTS ANISOCYTOSIS  1+        RBC COMMENTS MICROCYTOSIS  1+        RBC COMMENTS HYPOCHROMIA  1+        WBC COMMENTS ATYPICAL LYMPHOCYTES PRESENT           Imaging:     Assessment:     Leann Kay is a 39 y.o. female with past medical history of generalized anxiety disorder, IV heroin use daily who is admitted for RLE cellulitis.        Plan:       RLE Cellulitis  -Likely secondary to IV drug use  -CT of right lower extremity shows diffuse cellulitis but no fluid collection of bone involvement  -Continue with vancomycin and Zosyn  -Obtain and follow-up blood cultures    IV drug abuse  -Patient is a daily heroin user  -Anticipate withdrawal  -Start Valium for withdrawal symptoms  -Can start daily Suboxone 24 hours into hospital stay  -Monitor closely for withdrawal symptoms    Generalized anxiety disorder  -Valium as above    Microcytic anemia  -Anemia work-up with PCP            FEN/GI -  NS @ 125 ml/hr  Activity - As tolerated  DVT prophylaxis - Lovenox  GI prophylaxis -  NI  Disposition - Home    CODE STATUS:  Full code       Signed By: Fern Peters MD     May 15, 2021

## 2021-05-16 NOTE — PROGRESS NOTES
[facility] Adult  Hospitalist Group                                                                                          Hospitalist Progress Note  Teo Holland MD  Answering service: [phone] OR 36 from in house phone        Date of Service:  2021  NAME:  Lorie Miller  :  1985  MRN:  [id number]      Admission Summary:   Lorie Miller is a 39 y.o. female with past medical history of generalized anxiety disorder, IV heroin use daily who presents to hospital due to concerns of infection in her right foot. Patient states that she had tried to inject her in her right lower extremity about 2 weeks ago but had missed a vein. She noticed a small area of induration and erythema which she states has worsened over the last week. She now describes increased erythema and swelling around the area as well as tenderness. She has had chills but no fevers or night sweats. No recent travel or sick contacts. States she has previously been on Suboxone therapy but relapsed.     Interval history / Subjective:    pt seen and examined cellulitis improved may need I and d for the abscess     Assessment & Plan:     RLE Cellulitis  -Likely secondary to IV drug use  -CT of right lower extremity shows diffuse cellulitis but no fluid collection of bone involvement  -Continue with vancomycin and Zosyn  -Obtain and follow-up blood cultures- NGTD      IV drug abuse  -Patient is a daily heroin user  -Anticipate withdrawal  -will do methadone order set  -Monitor closely for withdrawal symptoms     Generalized anxiety disorder  -Valium as above     Microcytic anemia  -Anemia work-up with PCP     Code status: Full  DVT prophylaxis: 3 [facility] discussed with: Patient/Family and Nurse  Anticipated Disposition: Home w/Family  Anticipated Discharge: 24 hours to 48 hours     Hospital Problems  Date Reviewed: 2019          Codes Class Noted POA    Cellulitis of right leg ICD-10-CM: L03.115  ICD-9-CM: 682.6  5/15/2021 Unknown                Review of Systems:   A comprehensive review of systems was negative. Vital Signs:    Last 24hrs VS reviewed since prior progress note. Most recent are:  Visit Vitals  /67 (BP 1 Location: Right upper arm, BP Patient Position: At rest)   Pulse 84   Temp 99 °F (37.2 °C)   Resp 16   Ht 5' 5\" (1.651 m)   Wt 72.6 kg (160 lb)   SpO2 97%   BMI 26.63 kg/m²         Intake/Output Summary (Last 24 hours) at 5/16/2021 1022  Last data filed at 5/16/2021 0042  Gross per 24 hour   Intake 1100 ml   Output    Net 1100 ml        Physical Examination:     I had a face to face encounter with this patient and independently examined them on 5/16/2021 as outlined below:          Constitutional:  No acute distress, cooperative, pleasant    ENT:  Oral mucosa moist, oropharynx benign. Resp:  CTA bilaterally. No wheezing/rhonchi/rales. No accessory muscle use   CV:  Regular rhythm, normal rate, no murmurs, gallops, rubs    GI:  Soft, non distended, non tender. normoactive bowel sounds, no hepatosplenomegaly     Musculoskeletal:  rt LE red swollen with an abscess     Neurologic:  Moves all extremities. AAOx3, CN II-XII reviewed            Data Review:    I personally reviewed  Image and labs      Labs:     Recent Labs     05/16/21  0811 05/15/21  2126   WBC 5.5 6.6   HGB 9.5* 9.2*   HCT 31.3* 28.9*    235     Recent Labs     05/16/21  0811 05/15/21  2022    134*   K 3.4* 4.4    104   CO2 24 27   BUN 10 16   CREA 0.62 0.81   GLU 94 97   CA 7.2* 8.3*     Recent Labs     05/16/21  0811 05/15/21  2022   ALT 24 33    128*   TBILI 0.3 0.4   TP 6.4 8.4*   ALB 2.6* 3.5   GLOB 3.8 4.9*     No results for input(s): INR, PTP, APTT, INREXT in the last 72 hours. No results for input(s): FE, TIBC, PSAT, FERR in the last 72 hours. No results found for: FOL, RBCF   No results for input(s): PH, PCO2, PO2 in the last 72 hours.   No results for input(s): CPK, CKNDX, TROIQ in the last 72 hours.     No lab exists for component: CPKMB  No results found for: CHOL, CHOLX, CHLST, CHOLV, HDL, HDLP, LDL, LDLC, DLDLP, TGLX, TRIGL, TRIGP, CHHD, CHHDX  No results found for: [facility]  Lab Results   Component Value Date/Time    Color YELLOW 07/21/2013 02:42 AM    Appearance CLEAR 07/21/2013 02:42 AM    Specific gravity 1.025 07/21/2013 02:42 AM    pH (UA) 6.5 07/21/2013 02:42 AM    Protein TRACE (A) 07/21/2013 02:42 AM    Glucose NEGATIVE  07/21/2013 02:42 AM    Ketone 40 (A) 07/21/2013 02:42 AM    Bilirubin SMALL (A) 07/21/2013 02:42 AM    Urobilinogen 1.0 07/21/2013 02:42 AM    Nitrites NEGATIVE  07/21/2013 02:42 AM    Leukocyte Esterase NEGATIVE  07/21/2013 02:42 AM    Epithelial cells 0-5 07/21/2013 02:42 AM    Bacteria 1+ (A) 07/21/2013 02:42 AM    WBC 0-4 07/21/2013 02:42 AM    RBC 0-3 07/21/2013 02:42 AM         Medications Reviewed:     Current Facility-Administered Medications   Medication Dose Route Frequency    sodium chloride (NS) flush 5-40 mL  5-40 mL IntraVENous Q8H    sodium chloride (NS) flush 5-40 mL  5-40 mL IntraVENous PRN    acetaminophen (TYLENOL) tablet 650 mg  650 mg Oral Q6H PRN    Or    acetaminophen (TYLENOL) suppository 650 mg  650 mg Rectal Q6H PRN    polyethylene glycol (MIRALAX) packet 17 g  17 g Oral DAILY PRN    promethazine (PHENERGAN) tablet 12.5 mg  12.5 mg Oral Q6H PRN    Or    ondansetron (ZOFRAN) injection 4 mg  4 mg IntraVENous Q6H PRN    enoxaparin (LOVENOX) injection 40 mg  40 mg SubCUTAneous DAILY    0.9% sodium chloride infusion  125 mL/hr IntraVENous CONTINUOUS    diazePAM (VALIUM) tablet 5 mg  5 mg Oral Q6H PRN    vancomycin (VANCOCIN) 1250 mg in  ml infusion  1,250 mg IntraVENous Q12H    Vancomycin dosing per pharmacy   Other Rx Dosing/Monitoring    piperacillin-tazobactam (ZOSYN) 3.375 g in 0.9% sodium chloride (MBP/ADV) 100 mL MBP  3.375 g IntraVENous Q8H     ______________________________________________________________________  EXPECTED LENGTH OF STAY: - - -  ACTUAL LENGTH OF STAY:          1                 Torres Fitzgeradl MD

## 2021-05-16 NOTE — PROGRESS NOTES
TRANSFER - IN REPORT:    Verbal report received from Lacy Geiger RN(name) on [place]  being received from ED(unit) for routine progression of care      Report consisted of patients Situation, Background, Assessment and   Recommendations(SBAR). Information from the following report(s) SBAR, Kardex, ED Summary, [facility] and Recent Results was reviewed with the receiving nurse. Opportunity for questions and clarification was provided. Assessment completed upon patients arrival to unit and care assumed.

## 2021-05-16 NOTE — ROUTINE PROCESS
TRANSFER - OUT REPORT: 
 
Verbal report given to CHRIS Cuello(name) on [place]  being transferred to (unit) for routine progression of care Report consisted of patients Situation, Background, Assessment and  
Recommendations(SBAR). Information from the following report(s) SBAR, ED Summary, [facility] and Recent Results was reviewed with the receiving nurse. Lines:  
Peripheral IV 05/15/21 Left Forearm (Active) Site Assessment Clean, dry, & intact 05/15/21 2050 Phlebitis Assessment 0 05/15/21 2050 Infiltration Assessment 0 05/15/21 2050 Dressing Status Clean, dry, & intact 05/15/21 2050 Dressing Type Transparent 05/15/21 2050 Hub Color/Line Status Blue;Flushed;Patent 05/15/21 2050 Action Taken Blood drawn 05/15/21 2050 Opportunity for questions and clarification was provided. Patient transported with: 
 Registered Nurse

## 2021-05-17 VITALS
SYSTOLIC BLOOD PRESSURE: 121 MMHG | TEMPERATURE: 97.9 F | OXYGEN SATURATION: 97 % | WEIGHT: 160 LBS | DIASTOLIC BLOOD PRESSURE: 75 MMHG | HEIGHT: 65 IN | RESPIRATION RATE: 16 BRPM | HEART RATE: 75 BPM | BODY MASS INDEX: 26.66 KG/M2

## 2021-05-17 PROBLEM — L03.115 CELLULITIS OF RIGHT LEG: Status: RESOLVED | Noted: 2021-05-15 | Resolved: 2021-05-17

## 2021-05-17 LAB
ANION GAP SERPL CALC-SCNC: 6 MMOL/L (ref 5–15)
BACTERIA SPEC CULT: ABNORMAL
BACTERIA SPEC CULT: ABNORMAL
BUN SERPL-MCNC: 6 MG/DL (ref 6–20)
BUN/CREAT SERPL: 10 (ref 12–20)
CALCIUM SERPL-MCNC: 7.6 MG/DL (ref 8.5–10.1)
CHLORIDE SERPL-SCNC: 111 MMOL/L (ref 97–108)
CO2 SERPL-SCNC: 26 MMOL/L (ref 21–32)
CREAT SERPL-MCNC: 0.59 MG/DL (ref 0.55–1.02)
GLUCOSE SERPL-MCNC: 93 MG/DL (ref 65–100)
GRAM STN SPEC: ABNORMAL
POTASSIUM SERPL-SCNC: 3.4 MMOL/L (ref 3.5–5.1)
SERVICE CMNT-IMP: ABNORMAL
SODIUM SERPL-SCNC: 143 MMOL/L (ref 136–145)

## 2021-05-17 PROCEDURE — 74011250637 HC RX REV CODE- 250/637: Performed by: HOSPITALIST

## 2021-05-17 PROCEDURE — 74011250637 HC RX REV CODE- 250/637: Performed by: STUDENT IN AN ORGANIZED HEALTH CARE EDUCATION/TRAINING PROGRAM

## 2021-05-17 PROCEDURE — 36415 COLL VENOUS BLD VENIPUNCTURE: CPT

## 2021-05-17 PROCEDURE — 80048 BASIC METABOLIC PNL TOTAL CA: CPT

## 2021-05-17 PROCEDURE — 74011000258 HC RX REV CODE- 258: Performed by: STUDENT IN AN ORGANIZED HEALTH CARE EDUCATION/TRAINING PROGRAM

## 2021-05-17 PROCEDURE — 74011250636 HC RX REV CODE- 250/636: Performed by: STUDENT IN AN ORGANIZED HEALTH CARE EDUCATION/TRAINING PROGRAM

## 2021-05-17 RX ORDER — POTASSIUM CHLORIDE 750 MG/1
40 TABLET, FILM COATED, EXTENDED RELEASE ORAL
Status: COMPLETED | OUTPATIENT
Start: 2021-05-17 | End: 2021-05-17

## 2021-05-17 RX ORDER — SULFAMETHOXAZOLE AND TRIMETHOPRIM 800; 160 MG/1; MG/1
1 TABLET ORAL 2 TIMES DAILY
Qty: 14 TAB | Refills: 0 | Status: SHIPPED | OUTPATIENT
Start: 2021-05-17 | End: 2021-05-24

## 2021-05-17 RX ORDER — LEVOFLOXACIN 750 MG/1
750 TABLET ORAL DAILY
Qty: 7 TAB | Refills: 0 | Status: SHIPPED | OUTPATIENT
Start: 2021-05-17 | End: 2021-05-24

## 2021-05-17 RX ADMIN — VANCOMYCIN HYDROCHLORIDE 1250 MG: 10 INJECTION, POWDER, LYOPHILIZED, FOR SOLUTION INTRAVENOUS at 10:41

## 2021-05-17 RX ADMIN — ENOXAPARIN SODIUM 40 MG: 40 INJECTION SUBCUTANEOUS at 09:17

## 2021-05-17 RX ADMIN — PIPERACILLIN AND TAZOBACTAM 3.38 G: 3; .375 INJECTION, POWDER, LYOPHILIZED, FOR SOLUTION INTRAVENOUS at 06:10

## 2021-05-17 RX ADMIN — Medication 10 ML: at 06:00

## 2021-05-17 RX ADMIN — Medication 10 ML: at 00:00

## 2021-05-17 RX ADMIN — DIAZEPAM 5 MG: 5 TABLET ORAL at 02:14

## 2021-05-17 RX ADMIN — POTASSIUM CHLORIDE 40 MEQ: 750 TABLET, EXTENDED RELEASE ORAL at 11:20

## 2021-05-17 NOTE — DISCHARGE INSTRUCTIONS
Discharge Instructions       PATIENT ID: Christie Bolton  MRN: [id number]   YOB: 1985    DATE OF ADMISSION: 5/15/2021  7:34 PM    DATE OF DISCHARGE: 5/17/2021    PRIMARY CARE PROVIDER: Kev Coleman MD     ATTENDING PHYSICIAN: Sowmya Maguire MD  DISCHARGING PROVIDER: Billie Knapp MD    To contact this individual call [phone] and ask the  to page. If unavailable ask to be transferred the Adult Hospitalist Department. DISCHARGE DIAGNOSES CELLULITIS    CONSULTATIONS: IP CONSULT TO GENERAL SURGERY    PROCEDURES/SURGERIES: * No surgery found *    PENDING TEST RESULTS:   At the time of discharge the following test results are still pending:     FOLLOW UP APPOINTMENTS:   Follow-up Information     Follow up With Specialties Details Why Contact Info    Kev Coleman MD    Patient can only remember the practice name and not the physician         PCP F/U IN 1 WEEK            ADDITIONAL CARE RECOMMENDATIONS:     DIET: Regular Diet    ACTIVITY: Activity as tolerated    WOUND CARE:     EQUIPMENT needed:       Radiology      DISCHARGE MEDICATIONS:   See Medication Reconciliation Form    · It is important that you take the medication exactly as they are prescribed. · Keep your medication in the bottles provided by the pharmacist and keep a list of the medication names, dosages, and times to be taken in your wallet. · Do not take other medications without consulting your doctor. NOTIFY YOUR PHYSICIAN FOR ANY OF THE FOLLOWING:   Fever over 101 degrees for 24 hours. Chest pain, shortness of breath, fever, chills, nausea, vomiting, diarrhea, change in mentation, falling, weakness, bleeding. Severe pain or pain not relieved by medications. Or, any other signs or symptoms that you may have questions about. DISPOSITION:  X  Home With:   OT  PT  HH  RN       SNF/Inpatient Rehab/LTAC    Independent/assisted living    Hospice    Other:     CDMP Checked:    Yes X     PROBLEM LIST Updated:   Yes X       Signed:   Pennie Morton MD  5/17/2021  10:46 AM

## 2021-05-17 NOTE — DISCHARGE SUMMARY
Discharge Summary       PATIENT ID: Yuriy London  MRN: [id number]   YOB: 1985    DATE OF ADMISSION: 5/15/2021  7:34 PM    DATE OF DISCHARGE: 5/17/21   PRIMARY CARE PROVIDER: Theo Escobedo MD     ATTENDING PHYSICIAN: Mike Montiel  DISCHARGING PROVIDER: Josette Hoffmann MD    To contact this individual call [phone] and ask the  to page. If unavailable ask to be transferred the Adult Hospitalist Department.     CONSULTATIONS: IP CONSULT TO GENERAL SURGERY    PROCEDURES/SURGERIES: * No surgery found *    ADMITTING DIAGNOSES & HOSPITAL COURSE:   Urszula Haynes is a 39 y.o. female with past medical history of generalized anxiety disorder, IV heroin use daily who presents to hospital due to concerns of infection in her right foot.  Patient states that she had tried to inject her in her right lower extremity about 2 weeks ago but had missed a vein.  She noticed a small area of induration and erythema which she states has worsened over the last week. Cee Green now describes increased erythema and swelling around the area as well as tenderness.  She has had chills but no fevers or night sweats.  No recent travel or sick contacts.  States she has previously been on Suboxone therapy but relapsed.      Assessment & Plan:      RLE Cellulitis  -Likely secondary to IV drug use NO WIN=MPROVED  PER C/S GN POS COCCI AND GM NEG RODS WILL COVER BOTH ORALLY   -CT of right lower extremity shows diffuse cellulitis but no fluid collection of bone involvement     IV drug abuse COUNSELED ABSTAINENCE     Generalized anxiety disorder     Microcytic anemia  -Anemia work-up with PCP             DISCHARGE DIAGNOSES / PLAN:      1. D/C HOME     ADDITIONAL CARE RECOMMENDATIONS:        PENDING TEST RESULTS:   At the time of discharge the following test results are still pending:     FOLLOW UP APPOINTMENTS:    Follow-up Information     Follow up With Specialties Details Why Contact Info    Other, MD Kev    Patient can only remember the practice name and not the physician         PCP F/U IN 1 WEEK              DIET: Regular Diet      ACTIVITY: Activity as tolerated    WOUND CARE:     EQUIPMENT needed:       DISCHARGE MEDICATIONS:  Current Discharge Medication List      START taking these medications    Details   levoFLOXacin (Levaquin) 750 mg tablet Take 1 Tab by mouth daily for 7 days. Qty: 7 Tab, Refills: 0  Start date: 5/17/2021, End date: 5/24/2021      trimethoprim-sulfamethoxazole (Bactrim DS) 160-800 mg per tablet Take 1 Tab by mouth two (2) times a day for 7 days. Qty: 14 Tab, Refills: 0  Start date: 5/17/2021, End date: 5/24/2021      L.acidoph & parac-S.therm-Bifido (TIFFANY Q2/RISAQUAD-2) 16 billion cell cap cap Take 1 Cap by mouth daily for 15 days. Qty: 15 Cap, Refills: 0  Start date: 5/17/2021, End date: 6/1/2021         CONTINUE these medications which have NOT CHANGED    Details   medroxyPROGESTERone (PROVERA) 10 mg tablet Take 1 Tab by mouth two (2) times a day. Qty: 10 Tab, Refills: 0      cloNIDine (CATAPRES) 0.1 mg tablet Take 1 Tab by mouth every eight (8) hours as needed (feeling shaky or agitate from heroin withdrawal) for 10 doses. Qty: 8 Tab, Refills: 0      ondansetron (ZOFRAN ODT) 4 mg disintegrating tablet Take 1 Tab by mouth every eight (8) hours as needed for Nausea. Qty: 20 Tab, Refills: 0      ALPRAZolam (XANAX) 0.25 mg tablet Take 0.25 mg by mouth two (2) times daily as needed. Indications: ANXIETY               NOTIFY YOUR PHYSICIAN FOR ANY OF THE FOLLOWING:   Fever over 101 degrees for 24 hours. Chest pain, shortness of breath, fever, chills, nausea, vomiting, diarrhea, change in mentation, falling, weakness, bleeding. Severe pain or pain not relieved by medications. Or, any other signs or symptoms that you may have questions about.     DISPOSITION:  X  Home With:   OT  PT  HH  RN       Long term SNF/Inpatient Rehab    Independent/assisted living    Hospice    Other:       PATIENT CONDITION AT DISCHARGE:     Functional status    Poor     Deconditioned    X Independent      Cognition    X Lucid     Forgetful     Dementia      Catheters/lines (plus indication)    Resendez     PICC     PEG    X None      Code status    X Full code     DNR      PHYSICAL EXAMINATION AT DISCHARGE:  General:          Alert, cooperative, no distress, appears stated age. HEENT:           Atraumatic, anicteric sclerae, pink conjunctivae                          No oral ulcers, mucosa moist, throat clear, dentition fair  Neck:               Supple, symmetrical  Lungs:             Clear to auscultation bilaterally. No Wheezing or Rhonchi. No rales. Chest wall:      No tenderness  No Accessory muscle use. Heart:              Regular  rhythm,  No  murmur   No edema  Abdomen:        Soft, non-tender. Not distended. Bowel sounds normal  Extremities:     No cyanosis. No clubbing,                            Skin turgor normal, Capillary refill normal  Skin:                Not pale. Not Jaundiced  No rashes   Psych:             Not anxious or agitated.   Neurologic:      Alert, moves all extremities, answers questions appropriately and responds to commands       CHRONIC MEDICAL DIAGNOSES:  Problem List as of 5/17/2021 Date Reviewed: 2/18/2019          Codes Class Noted - Resolved    [facility] ([facility] ICD-10-CM: F11.23  ICD-9-CM: 292.0, 304.00  7/21/2013 - Present        Tylenol overdose ICD-10-CM: T39.1X1A  ICD-9-CM: 965.4, E980.0  1/24/2013 - Present        RESOLVED: Cellulitis of right leg ICD-10-CM: L03.115  ICD-9-CM: 682.6  5/15/2021 - 5/17/2021              Greater than 30  minutes were spent with the patient on counseling and coordination of care    Signed:   Marcia Cabral MD  5/17/2021  10:47 AM

## 2021-05-17 NOTE — PROGRESS NOTES
GEMA: The patient plans to discharge home with friend to transport when stable for discharge. RUR: 13%    1. The patient is from home and lives with her mother. 2. Orthopedics and hospitalist following. 3. The patient plans to discharge home with friend to transport. Care Management Interventions  PCP Verified by CM: No  Palliative Care Criteria Met (RRAT>21 & CHF Dx)?: No  Mode of Transport at Discharge: Other (see comment)  Transition of Care Consult (CM Consult): [address]: No  MyChart Signup: No  Discharge Durable Medical Equipment: No  Health Maintenance Reviewed: Yes  Physical Therapy Consult: No  Occupational Therapy Consult: No  Speech Therapy Consult: No  Current Support Network: Other(The patient lives with mother.)  Confirm Follow Up Transport: Family  The Plan for Transition of Care is Related to the Following Treatment Goals : The patient plans to discharge home with friend to transport. The Patient and/or Patient Representative was Provided with a Choice of Provider and Agrees with the Discharge Plan?: No  Freedom of Choice List was Provided with Basic Dialogue that Supports the Patient's Individualized Plan of Care/Goals, Treatment Preferences and Shares the Quality Data Associated with the Providers?: No   Resource Information Provided?: No  Discharge Location  Discharge Placement: Home with family assistance     Reason for Admission:  Cellulitis of right leg                   RUR Score: 13%                  Plan for utilizing home health:  No indications at this time. PCP: First and Last name:  Kev Coleman MD- The patient is to [facility] appointment at [facility] one [facility].    Name of Practice:    Are you a current patient: Yes/No: No   Approximate date of last visit:    Can you participate in a virtual visit with your PCP:                     Current Advanced Directive/Advance Care Plan: Full Code      Healthcare Decision Maker:   Click here to complete [address] including selection of the Healthcare Decision Maker Relationship (ie \"Primary\")                             Transition of Care Plan:     CM spoke with patient in room 555. The patient is alert and oriented x4. Confirmed demographics. The patient is independent with ADL's/IADL's and plans to discharge home with friend, Radha Vogel ([phone]) to transport. The patient does not have a current PCP or insurance. CM gave [facility] application to patient and called and left message with the following location to schedule an appointment for the patient. [facility], [place] (near [place])  [address] [place] [address]  [phone]    The patient has a scheduled appointment with the [facility] for Thursday 5/20/21 at 1:30pm.  The patient stated that she will be able to for pay the $64.94 for her prescriptions here before discharge and will pick them up when she discharges. The patient has the hard copies in her possession. CM faxed down the prescriptions to fill. RN notified. CM following for discharge needs.     Florentin Padilla RN/CRM

## 2021-05-17 NOTE — PROGRESS NOTES
Hospital follow-up new PCP transitional care appointment has been scheduled with [address] for Thursday, 5/20/21 at 1:30 p.m. Pending patient discharge.   John Branch, Care Management Specialist.

## 2021-05-20 LAB
BACTERIA SPEC CULT: NORMAL
BACTERIA SPEC CULT: NORMAL
SERVICE CMNT-IMP: NORMAL
SERVICE CMNT-IMP: NORMAL

## 2021-06-20 ENCOUNTER — APPOINTMENT (OUTPATIENT)
Dept: CT IMAGING | Age: 36
End: 2021-06-20
Attending: NURSE PRACTITIONER

## 2021-06-20 ENCOUNTER — HOSPITAL ENCOUNTER (EMERGENCY)
Age: 36
Discharge: LEFT BEFORE DISCHARGE | End: 2021-06-20
Attending: EMERGENCY MEDICINE

## 2021-06-20 VITALS
HEART RATE: 112 BPM | OXYGEN SATURATION: 97 % | SYSTOLIC BLOOD PRESSURE: 150 MMHG | TEMPERATURE: 99.4 F | RESPIRATION RATE: 16 BRPM | DIASTOLIC BLOOD PRESSURE: 95 MMHG

## 2021-06-20 DIAGNOSIS — R10.9 FLANK PAIN: Primary | ICD-10-CM

## 2021-06-20 DIAGNOSIS — F19.10 SUBSTANCE ABUSE (HCC): ICD-10-CM

## 2021-06-20 LAB
ALBUMIN SERPL-MCNC: 3.6 G/DL (ref 3.5–5)
ALBUMIN/GLOB SERPL: 0.8 {RATIO} (ref 1.1–2.2)
ALP SERPL-CCNC: 147 U/L (ref 45–117)
ALT SERPL-CCNC: 33 U/L (ref 12–78)
ANION GAP SERPL CALC-SCNC: 4 MMOL/L (ref 5–15)
AST SERPL-CCNC: 36 U/L (ref 15–37)
BILIRUB SERPL-MCNC: 0.3 MG/DL (ref 0.2–1)
BUN SERPL-MCNC: 14 MG/DL (ref 6–20)
BUN/CREAT SERPL: 16 (ref 12–20)
CALCIUM SERPL-MCNC: 9.6 MG/DL (ref 8.5–10.1)
CHLORIDE SERPL-SCNC: 107 MMOL/L (ref 97–108)
CO2 SERPL-SCNC: 25 MMOL/L (ref 21–32)
CREAT SERPL-MCNC: 0.9 MG/DL (ref 0.55–1.02)
GLOBULIN SER CALC-MCNC: 4.7 G/DL (ref 2–4)
GLUCOSE SERPL-MCNC: 99 MG/DL (ref 65–100)
POTASSIUM SERPL-SCNC: 4.6 MMOL/L (ref 3.5–5.1)
PROT SERPL-MCNC: 8.3 G/DL (ref 6.4–8.2)
SODIUM SERPL-SCNC: 136 MMOL/L (ref 136–145)

## 2021-06-20 PROCEDURE — 80053 COMPREHEN METABOLIC PANEL: CPT

## 2021-06-20 PROCEDURE — 99282 EMERGENCY DEPT VISIT SF MDM: CPT

## 2021-06-20 PROCEDURE — 36415 COLL VENOUS BLD VENIPUNCTURE: CPT

## 2021-06-20 NOTE — ED TRIAGE NOTES
Patient presents from home with complaints of \"I figured out I have a kidney stone, I can feel it moving\". Patient reports this has been ongoing for over a week.  Patient also reports fevers and nausea

## 2021-06-20 NOTE — ED PROVIDER NOTES
HPI     Kulwinder Gilbert is a 39 y.o. female with Hx of polysubstance abuse who presents ambulatory to [facility] ED with cc of L flank pain w/ nausea x1 week. Pt states she feels kidney stone moving. Pt reports that it is \"hard to move urine out\"; but denies any dysuria, urinary frequency, notes that she has remote hx of UTI. She states \"I am not sure\" when asked if she is on antibiotics. Pt also notes that she does not feel her food is digesting well. Reports bloating, abd distention \"for awhile at least a month. \" Has not taken any OTC medications for s/sx, does not that she last had Heroin @ 1400 today. Pt states \"I am not sure\" when asked if she is pregnant. Denies any fevers, chills, body aches, nausea, vomiting, diarrhea. PCP: Other, MD Kev    There are no other complaints, changes or physical findings at this time. Past Medical History:   Diagnosis Date    Drug abuse [facility])     heroin daily habit    Heroin abuse ([facility] [address]        History reviewed. No pertinent surgical history. Family History:   Problem Relation Age of Onset    Other Father         hepatitis b from IVDA in Father       Social History     Socioeconomic History    Marital status: SINGLE     Spouse name: Not on file    Number of children: Not on file    Years of education: Not on file    Highest education level: Not on file   Occupational History    Not on file   Tobacco Use    Smoking status: Current Every Day Smoker     Packs/day: 0.50    Smokeless tobacco: Never Used    Tobacco comment: vapes   Substance and Sexual Activity    Alcohol use: No    Drug use: Yes     Types: Heroin     Comment: heroin insufflation daily    Sexual activity: Yes     Birth control/protection: I.U.D.    Other Topics Concern    Not on file   Social History Narrative    Not on file     Social Determinants of Health     Financial Resource Strain:     Difficulty of Paying Living Expenses:    Food Insecurity:     Worried About Running Out of Food in the Last Year:    [address] in the Last Year:    Transportation Needs:     Lack of Transportation (Medical):  Lack of Transportation (Non-Medical):    Physical Activity:     Days of Exercise per Week:     Minutes of Exercise per Session:    Stress:     Feeling of Stress :    Social Connections:     Frequency of Communication with Friends and Family:     Frequency of Social Gatherings with Friends and Family:     Attends Judaism Services:     Active Member of Clubs or Organizations:     Attends Club or Organization Meetings:     Marital Status:    Intimate Partner Violence:     Fear of Current or Ex-Partner:     Emotionally Abused:     Physically Abused:     Sexually Abused: ALLERGIES: Phenergan [promethazine]    Review of Systems   Constitutional: Negative for activity change, appetite change, chills and fever. HENT: Negative for congestion, rhinorrhea and sore throat. Respiratory: Negative for cough and shortness of breath. Cardiovascular: Negative for chest pain. Gastrointestinal: Positive for abdominal distention and abdominal pain. Negative for diarrhea, nausea and vomiting. Genitourinary: Positive for flank pain and urgency. Negative for dysuria and frequency. Musculoskeletal: Negative for arthralgias, back pain, gait problem, joint swelling, myalgias and neck pain. Skin: Negative for color change and rash. Neurological: Negative for dizziness, weakness and headaches. Psychiatric/Behavioral: Negative for agitation, behavioral problems and confusion. All other systems reviewed and are negative. Vitals:    06/20/21 1825   BP: (!) 150/95   Pulse: (!) 112   Resp: 16   Temp: 99.4 °F (37.4 °C)   SpO2: 97%            Physical Exam  Vitals and nursing note reviewed. Constitutional:       General: She is not in acute distress. Appearance: She is well-developed. HENT:      Head: Normocephalic and atraumatic.       Right Ear: External ear normal.      Left Ear: External ear normal.   Eyes:      Conjunctiva/sclera: Conjunctivae normal.      Pupils: Pupils are equal, round, and reactive to light. Cardiovascular:      Rate and Rhythm: Normal rate and regular rhythm. Heart sounds: Normal heart sounds. Pulmonary:      Effort: Pulmonary effort is normal.      Breath sounds: Normal breath sounds. Abdominal:      Palpations: Abdomen is soft. Tenderness: There is no abdominal tenderness. There is no guarding or rebound. Musculoskeletal:         General: Normal range of motion. Cervical back: Normal range of motion and neck supple. Skin:     General: Skin is warm and dry. Neurological:      Mental Status: She is alert and oriented to person, place, and time. Psychiatric:         Attention and Perception: She is inattentive. Mood and Affect: Affect is blunt. Speech: Speech is delayed. Behavior: Behavior is slowed. Thought Content: Thought content normal.         Judgment: Judgment normal.          MDM  Number of Diagnoses or Management Options  Flank pain  Substance abuse ([facility] [address]  Diagnosis management comments: DDx: kidney stone, UTI, pregnancy, substance abuse     Pt intermittently in the ED, did not stay in her room as requested which made getting additional testing (like urine HCG) challenging. CBC clotted, BMP reviewed. Pt reassessed and not in room, RN stated that she felt patient had left. Was unable to provide any discharge teaching.           Amount and/or Complexity of Data Reviewed  Clinical lab tests: ordered and reviewed  Tests in the radiology section of CPT®: ordered  Review and summarize past medical records: yes           Procedures      LABORATORY TESTS:  Recent Results (from the past 12 hour(s))   METABOLIC PANEL, COMPREHENSIVE    Collection Time: 06/20/21  7:58 PM   Result Value Ref Range    Sodium 136 136 - 145 mmol/L    Potassium 4.6 3.5 - 5.1 mmol/L    Chloride 107 97 - 108 mmol/L CO2 25 21 - 32 mmol/L    Anion gap 4 (L) 5 - 15 mmol/L    Glucose 99 65 - 100 mg/dL    BUN 14 6 - 20 MG/DL    Creatinine 0.90 0.55 - 1.02 MG/DL    BUN/Creatinine ratio 16 12 - 20      GFR est AA >60 >60 ml/min/1.73m2    GFR est non-AA >60 >60 ml/min/1.73m2    Calcium 9.6 8.5 - 10.1 MG/DL    Bilirubin, total 0.3 0.2 - 1.0 MG/DL    ALT (SGPT) 33 12 - 78 U/L    AST (SGOT) 36 15 - 37 U/L    Alk. phosphatase 147 (H) 45 - 117 U/L    Protein, total 8.3 (H) 6.4 - 8.2 g/dL    Albumin 3.6 3.5 - 5.0 g/dL    Globulin 4.7 (H) 2.0 - 4.0 g/dL    A-G Ratio 0.8 (L) 1.1 - 2.2         IMAGING RESULTS:  No orders to display       MEDICATIONS GIVEN:  Medications - No data to display    IMPRESSION:  1. Flank pain    2. Substance abuse ([facility] [address]        PLAN:  9:05 PM Per nursing staff have rechecked several times and cannot find patient. Patient apparently seen walking out of ER. Concerns for possible substance abuse, per nursing staff patient returned from leaving ED w/  Falling asleep frequently, but ambulatory and arousable. Rechecked and patient not in [address] [place] [address] or room or [place].

## 2021-06-21 NOTE — ED NOTES
Multiple attempts made to call for patient in the waiting room and look for her in her room. It has been over 40 min. Patient left after being seen and roomed.

## 2023-05-12 RX ORDER — ALPRAZOLAM 0.25 MG/1
TABLET ORAL 2 TIMES DAILY PRN
COMMUNITY

## 2023-05-12 RX ORDER — CLONIDINE HYDROCHLORIDE 0.1 MG/1
TABLET ORAL EVERY 8 HOURS PRN
COMMUNITY
Start: 2013-07-22

## 2023-05-12 RX ORDER — ONDANSETRON 4 MG/1
TABLET, ORALLY DISINTEGRATING ORAL EVERY 8 HOURS PRN
COMMUNITY
Start: 2013-07-22

## 2023-05-12 RX ORDER — MEDROXYPROGESTERONE ACETATE 10 MG/1
TABLET ORAL 2 TIMES DAILY
COMMUNITY
Start: 2019-01-29